# Patient Record
Sex: FEMALE | Race: WHITE | NOT HISPANIC OR LATINO | Employment: UNEMPLOYED | URBAN - METROPOLITAN AREA
[De-identification: names, ages, dates, MRNs, and addresses within clinical notes are randomized per-mention and may not be internally consistent; named-entity substitution may affect disease eponyms.]

---

## 2017-01-04 PROBLEM — L73.9 FOLLICULITIS: Status: ACTIVE | Noted: 2017-01-04

## 2017-06-02 ENCOUNTER — HOSPITAL ENCOUNTER (EMERGENCY)
Facility: HOSPITAL | Age: 47
Discharge: HOME | End: 2017-06-02
Attending: EMERGENCY MEDICINE
Payer: MEDICAID

## 2017-06-02 VITALS
WEIGHT: 260 LBS | SYSTOLIC BLOOD PRESSURE: 142 MMHG | RESPIRATION RATE: 20 BRPM | DIASTOLIC BLOOD PRESSURE: 78 MMHG | TEMPERATURE: 98 F | BODY MASS INDEX: 41.97 KG/M2 | HEART RATE: 72 BPM

## 2017-06-02 DIAGNOSIS — J32.1 FRONTAL SINUSITIS, UNSPECIFIED CHRONICITY: Primary | ICD-10-CM

## 2017-06-02 DIAGNOSIS — K08.89 PAIN, DENTAL: ICD-10-CM

## 2017-06-02 PROCEDURE — 99284 EMERGENCY DEPT VISIT MOD MDM: CPT

## 2017-06-02 PROCEDURE — 25000003 PHARM REV CODE 250: Performed by: EMERGENCY MEDICINE

## 2017-06-02 RX ORDER — HYDROCODONE BITARTRATE AND ACETAMINOPHEN 5; 325 MG/1; MG/1
1 TABLET ORAL
Status: COMPLETED | OUTPATIENT
Start: 2017-06-02 | End: 2017-06-02

## 2017-06-02 RX ORDER — HYDROCODONE BITARTRATE AND ACETAMINOPHEN 5; 325 MG/1; MG/1
1 TABLET ORAL EVERY 4 HOURS PRN
Qty: 15 TABLET | Refills: 0 | Status: SHIPPED | OUTPATIENT
Start: 2017-06-02 | End: 2017-06-03 | Stop reason: SDUPTHER

## 2017-06-02 RX ORDER — AMOXICILLIN 500 MG/1
500 CAPSULE ORAL
Status: COMPLETED | OUTPATIENT
Start: 2017-06-02 | End: 2017-06-02

## 2017-06-02 RX ORDER — AMOXICILLIN 500 MG/1
500 CAPSULE ORAL 3 TIMES DAILY
Qty: 21 CAPSULE | Refills: 0 | Status: SHIPPED | OUTPATIENT
Start: 2017-06-02 | End: 2017-06-03 | Stop reason: SDUPTHER

## 2017-06-02 RX ADMIN — HYDROCODONE BITARTRATE AND ACETAMINOPHEN 1 TABLET: 5; 325 TABLET ORAL at 08:06

## 2017-06-02 RX ADMIN — AMOXICILLIN 500 MG: 500 CAPSULE ORAL at 08:06

## 2017-06-03 NOTE — ED PROVIDER NOTES
Encounter Date: 6/2/2017       History     Chief Complaint   Patient presents with    Dental Pain    Facial Pain     Review of patient's allergies indicates:   Allergen Reactions    Latex, natural rubber Other (See Comments)     Latex condoms burning pain.      Spiriva respimat [tiotropium bromide] Hives    Iodine      Blisters (mouth)^  Blisters (mouth)^    Adhesive tape-silicones Rash     Use paper tape  If necessary     The history is provided by the patient.   Dental Pain   The primary symptoms include mouth pain, headaches and cough. Primary symptoms do not include dental injury, oral bleeding, oral lesions, fever, shortness of breath, sore throat or angioedema. The symptoms began today. The symptoms are unchanged. The symptoms are new.   Mouth pain began 2 - 6 hours ago. At its highest the mouth pain was at 3/10. The mouth pain is currently at 2/10.     The headache began today. The pain from the headache is at a severity of 1/10. Location/region(s) of the headache: frontal. The headache is associated with activity. The headache is not associated with aura, photophobia, eye pain, visual change, neck stiffness, paresthesias, weakness or loss of balance.   Additional symptoms do not include: facial swelling, hearing loss and nosebleeds.     Past Medical History:   Diagnosis Date    Anticoagulant long-term use     COPD (chronic obstructive pulmonary disease)     Cubital tunnel syndrome on right     Depression     Diabetes mellitus     Diabetes mellitus, type 2     Encounter for blood transfusion     GERD (gastroesophageal reflux disease)     Glaucoma suspect     Glaucoma suspect of both eyes     High cholesterol     History of appendectomy     Hyperlipemia     Hypertension     Muscle spasms of both lower extremities     Neuropathy     Obesity     Radiation thyroiditis     Sensory peripheral neuropathy     Stroke     tia 3-4 years ago     Past Surgical History:   Procedure Laterality Date     APPENDECTOMY      CARPAL TUNNEL RELEASE Right      SECTION  , 2002    x2    CHOLECYSTECTOMY      DILATION AND CURETTAGE OF UTERUS      EYE SURGERY      TUBAL LIGATION  2002     Family History   Problem Relation Age of Onset    Diabetes Mother     Coronary artery disease Mother     Heart attack Mother     Hypertension Mother     Thyroid disease Father     Diabetes Father     Hypertension Father     Cirrhosis Father     Hepatitis Father      Social History   Substance Use Topics    Smoking status: Current Every Day Smoker     Packs/day: 0.50     Years: 30.00     Types: Cigarettes     Start date: 10/30/1984    Smokeless tobacco: Never Used    Alcohol use No     Review of Systems   Constitutional: Negative for fever.   HENT: Positive for dental problem. Negative for facial swelling, hearing loss, nosebleeds and sore throat.    Eyes: Negative for photophobia and pain.   Respiratory: Positive for cough. Negative for shortness of breath, wheezing and stridor.    Cardiovascular: Negative for chest pain, palpitations and leg swelling.   Gastrointestinal: Negative for abdominal pain, nausea and vomiting.   Musculoskeletal: Negative for neck pain and neck stiffness.   Skin: Negative for color change, pallor, rash and wound.   Neurological: Positive for headaches. Negative for tremors, seizures, syncope, speech difficulty, weakness, paresthesias and loss of balance.       Physical Exam     Initial Vitals [17]   BP Pulse Resp Temp SpO2   (!) 160/82 110 20 97.9 °F (36.6 °C) --     Physical Exam    Nursing note and vitals reviewed.  Constitutional: She appears well-developed and well-nourished. She is not diaphoretic. No distress.   HENT:   Head: Normocephalic and atraumatic.       Mouth/Throat: No oropharyngeal exudate.   Eyes: Conjunctivae and EOM are normal. Pupils are equal, round, and reactive to light. Right eye exhibits no discharge. Left eye exhibits no discharge. No scleral  icterus.   Neck: Normal range of motion. Neck supple. No JVD present.   Pulmonary/Chest: Breath sounds normal. No stridor. No respiratory distress. She has no wheezes. She has no rhonchi. She has no rales. She exhibits no tenderness.   Abdominal: Soft. Bowel sounds are normal. She exhibits no distension. There is no tenderness. There is no rebound and no guarding.   Musculoskeletal: Normal range of motion. She exhibits no edema or tenderness.   Neurological: She is alert and oriented to person, place, and time. She has normal strength and normal reflexes. No sensory deficit.         ED Course   Procedures  Labs Reviewed - No data to display                            ED Course     Clinical Impression:   The primary encounter diagnosis was Frontal sinusitis, unspecified chronicity. A diagnosis of Pain, dental was also pertinent to this visit.    Disposition:   Disposition: Discharged  Condition: Stable       Lalo Terry MD  06/02/17 2040

## 2017-07-05 ENCOUNTER — CLINICAL SUPPORT (OUTPATIENT)
Dept: SMOKING CESSATION | Facility: CLINIC | Age: 47
End: 2017-07-05
Payer: COMMERCIAL

## 2017-07-05 DIAGNOSIS — F17.210 HEAVY CIGARETTE SMOKER (20-39 PER DAY): Primary | ICD-10-CM

## 2017-07-05 PROCEDURE — 99404 PREV MED CNSL INDIV APPRX 60: CPT | Mod: S$GLB,,,

## 2017-07-05 RX ORDER — ASPIRIN/CALCIUM CARB/MAGNESIUM 325 MG
4 TABLET ORAL
Qty: 108 LOZENGE | Refills: 0 | Status: SHIPPED | OUTPATIENT
Start: 2017-07-05 | End: 2017-07-05

## 2017-07-05 RX ORDER — DM/P-EPHED/ACETAMINOPH/DOXYLAM 30-7.5/3
2 LIQUID (ML) ORAL
Qty: 108 LOZENGE | Refills: 0 | Status: SHIPPED | OUTPATIENT
Start: 2017-07-05 | End: 2017-12-18

## 2017-07-05 RX ORDER — VARENICLINE TARTRATE 0.5 (11)-1
KIT ORAL
Qty: 1 PACKAGE | Refills: 0 | Status: SHIPPED | OUTPATIENT
Start: 2017-07-05 | End: 2017-08-01

## 2017-07-18 ENCOUNTER — CLINICAL SUPPORT (OUTPATIENT)
Dept: SMOKING CESSATION | Facility: CLINIC | Age: 47
End: 2017-07-18
Payer: COMMERCIAL

## 2017-07-18 DIAGNOSIS — F17.210 HEAVY CIGARETTE SMOKER (20-39 PER DAY): Primary | ICD-10-CM

## 2017-07-18 PROCEDURE — 90853 GROUP PSYCHOTHERAPY: CPT | Mod: S$GLB,,,

## 2017-07-18 NOTE — Clinical Note
31ppm; 30 cig/day; The patient remains on the prescribed tobacco cessation medication regimen of 1 mg Chantix BID without any negative side effects at this time; desire to quit = 8, confidence = 7

## 2017-07-18 NOTE — PROGRESS NOTES
Site: [facility]  Date:  7/18/2017  Clinical Status of Patient: Outpatient   Length of Service and Code: 90 minutes - 64291   Number in Attendance: 3  Group Activities/Focus of Group:  orientation, client introductions, completion of TCRS (Tobacco Cessation Rating Scale) learned addiction model, cues/triggers, personal reasons for quitting, medications, goals, quit date    Target symptoms:  withdrawal and medication side effects             The following were rated moderate (3) to severe (4) on TCRS:       Moderate 3: desire/crave tobacco; reviewed with patient     Severe 4:   Insomnia, jaw muscle ache, back pain; reviewed with patient  Patient's Response to Intervention: 31ppm; 30 cig/day; The patient remains on the prescribed tobacco cessation medication regimen of 1 mg Chantix BID without any negative side effects at this time; desire to quit = 8, confidence = 7       Progress Toward Goals and Other Mental Status Changes: The patient denies any abnormal behavioral or mental changes at this time.     Diagnosis: Z72.0  Plan: The patient will continue with group therapy sessions and medication monitoring by CTTS. Prescribed medication management will be by physician.   Return to Clinic: 1 week

## 2017-07-25 ENCOUNTER — CLINICAL SUPPORT (OUTPATIENT)
Dept: SMOKING CESSATION | Facility: CLINIC | Age: 47
End: 2017-07-25
Payer: COMMERCIAL

## 2017-07-25 DIAGNOSIS — F17.210 HEAVY CIGARETTE SMOKER (20-39 PER DAY): Primary | ICD-10-CM

## 2017-07-25 PROCEDURE — 90853 GROUP PSYCHOTHERAPY: CPT | Mod: S$GLB,,,

## 2017-07-25 NOTE — PROGRESS NOTES
Smoking Cessation Group Session #2    Site: [facility]  Date:  7/25/2017  Clinical Status of Patient: Outpatient   Length of Service and Code: 90 minutes - 95404   Number in Attendance: 3  Group Activities/Focus of Group: completion of TCRS (Tobacco Cessation Rating Scale) reviewed strategies, cues, and triggers. Introduced the negative impact of tobacco on health, the health advantages of discontinuing the use of tobacco, time line improved health changes after a quit, withdrawal issues to expect from nicotine and habit, and ways to achieve the goal of a quit.    Target symptoms:  withdrawal and medication side effects             The following were rated moderate (3) to severe (4) on TCRS:       Moderate 3: desire/crave tobacco, depressed mood/sad, fatigue/weakness; reviewed with patient     Severe 4:   Angry/irritable/frustrated, increased appetite/hunger, back pain; reviewed with patient  Patient's Response to Intervention: 16ppm; 25 cig/day; The patient remains on the prescribed tobacco cessation medication regimen of 1 mg Chantix BID, along with 2mg nicotine lozenges, without any negative side effects at t his time; desire to quit = 8, confidence = 7       Progress Toward Goals and Other Mental Status Changes: The patient denies any abnormal behavioral or mental changes at this time.     Diagnosis: Z72.0  Plan: The patient will continue with group therapy sessions and medication monitoring by CTTS. Prescribed medication management will be by physician.   Return to Clinic: 1 week    Quit Date:    Planned Quit Date:

## 2017-07-25 NOTE — Clinical Note
16ppm; 25 cig/day; The patient remains on the prescribed tobacco cessation medication regimen of 1 mg Chantix BID, along with 2mg nicotine lozenges, without any negative side effects at t his time; desire to quit = 8, confidence = 7

## 2017-08-01 ENCOUNTER — CLINICAL SUPPORT (OUTPATIENT)
Dept: SMOKING CESSATION | Facility: CLINIC | Age: 47
End: 2017-08-01
Payer: COMMERCIAL

## 2017-08-01 DIAGNOSIS — F17.210 HEAVY CIGARETTE SMOKER (20-39 PER DAY): Primary | ICD-10-CM

## 2017-08-01 PROCEDURE — 90853 GROUP PSYCHOTHERAPY: CPT | Mod: S$GLB,,,

## 2017-08-01 RX ORDER — VARENICLINE TARTRATE 1 MG/1
1 TABLET, FILM COATED ORAL 2 TIMES DAILY
Qty: 60 TABLET | Refills: 0 | Status: SHIPPED | OUTPATIENT
Start: 2017-08-01 | End: 2017-09-02

## 2017-08-01 NOTE — Clinical Note
12ppm; 18-25 cig/day; The patient remains on the prescribed tobacco cessation medication regimen of 1 mg Chantix BID without any negative side effects at this time; desire to quit = 10, confidence = 10

## 2017-08-01 NOTE — PROGRESS NOTES
Smoking Cessation Group Session #3    Site: [facility]  Date:  8/1/2017  Clinical Status of Patient: Outpatient   Length of Service and Code: 90 minutes - 89431   Number in Attendance: 3  Group Activities/Focus of Group:  completion of TCRS (Tobacco Cessation Rating Scale) reviewed strategies, controlling environment, cues, triggers, new goals set. Introduced high risk situations with preparation interventions, caffeine similarities with withdrawal issues of habit and nicotine, Alcohol, Understanding urges, cravings, stress and relaxation. Open discussion with intervention discussion.    Target symptoms:  withdrawal and medication side effects             The following were rated moderate (3) to severe (4) on TCRS:       Moderate 3: angry/irritable/frustrated, increased appetite/hunger, difficulty concentrating, insomnia, dizzy, tremor/shaky, agitated/worked up, fatigue/weakness; reviewed with patient     Severe 4:   Back pain, dry mouth; reviewed with patient  Patient's Response to Intervention: 12ppm; 18-25 cig/day; The patient remains on the prescribed tobacco cessation medication regimen of 1 mg Chantix BID without any negative side effects at this time; desire to quit = 10, confidence = 10       Progress Toward Goals and Other Mental Status Changes: The patient denies any abnormal behavioral or mental changes at this time.     Diagnosis: Z72.0  Plan: The patient will continue with group therapy sessions and medication monitoring by CTTS. Prescribed medication management will be by physician.   Return to Clinic: 1 week    Quit Date:    Planned Quit Date:

## 2017-08-03 DIAGNOSIS — F41.9 ANXIETY: Chronic | ICD-10-CM

## 2017-08-04 RX ORDER — BUSPIRONE HYDROCHLORIDE 15 MG/1
15 TABLET ORAL 2 TIMES DAILY
Qty: 60 TABLET | Refills: 5 | OUTPATIENT
Start: 2017-08-04 | End: 2018-08-04

## 2017-08-04 RX ORDER — BACLOFEN 10 MG/1
10 TABLET ORAL 3 TIMES DAILY
Qty: 90 TABLET | Refills: 8 | OUTPATIENT
Start: 2017-08-04 | End: 2018-08-04

## 2017-08-15 ENCOUNTER — CLINICAL SUPPORT (OUTPATIENT)
Dept: SMOKING CESSATION | Facility: CLINIC | Age: 47
End: 2017-08-15
Payer: COMMERCIAL

## 2017-08-15 DIAGNOSIS — F17.210 MODERATE SMOKER (20 OR LESS PER DAY): Primary | ICD-10-CM

## 2017-08-15 PROCEDURE — 90853 GROUP PSYCHOTHERAPY: CPT | Mod: S$GLB,,,

## 2017-08-15 NOTE — PROGRESS NOTES
Smoking Cessation Group Session #5    Site: [facility]  Date:  8/15/2017  Clinical Status of Patient: Outpatient   Length of Service and Code: 90 minutes - 49497   Number in Attendance: 3  Group Activities/Focus of Group:  completion of TCRS (Tobacco Cessation Rating Scale) reviewed strategies, habitual behavior, high risks situations, understanding urges and cravings, stress and relaxation with open discussion and additional interventions, Introduced lapses, relapses, understanding them and analyzing the situation of a lapse, conflict issues that may be linked to a lapse.     Target symptoms:  withdrawal and medication side effects             The following were rated moderate (3) to severe (4) on TCRS:       Moderate 3: angry/irritable/frustrated, anxious/nervous, dry mouth; reviewed with patient     Severe 4:   Increased appetite/hunger, insomnia, back pain; reviewed with patient  Patient's Response to Intervention: 14ppm; 20 cig/day; The patient remains on the prescribed tobacco cessation medication regimen of 1 mg Chantix BID, along with 2mg nicotine lozenges as needed for cravings, without any negative side effects at this time; desire to quit = 8, confidence = 6      Progress Toward Goals and Other Mental Status Changes: The patient denies any abnormal behavioral or mental changes at this time.     Diagnosis: Z72.0  Plan: The patient will continue with group therapy sessions and medication monitoring by CTTS. Prescribed medication management will be by physician.   Return to Clinic: 1 week    Quit Date:    Planned Quit Date:

## 2017-08-15 NOTE — Clinical Note
14ppm; 20 cig/day; The patient remains on the prescribed tobacco cessation medication regimen of 1 mg Chantix BID, along with 2mg nicotine lozenges as needed for cravings, without any negative side effects at this time; desire to quit = 8, confidence = 6

## 2017-08-22 ENCOUNTER — CLINICAL SUPPORT (OUTPATIENT)
Dept: SMOKING CESSATION | Facility: CLINIC | Age: 47
End: 2017-08-22
Payer: COMMERCIAL

## 2017-08-22 DIAGNOSIS — F17.210 MODERATE SMOKER (20 OR LESS PER DAY): Primary | ICD-10-CM

## 2017-08-22 PROCEDURE — 90853 GROUP PSYCHOTHERAPY: CPT | Mod: S$GLB,,,

## 2017-08-22 NOTE — PROGRESS NOTES
Smoking Cessation Group Session #6    Site: [facility]  Date:  8/22/2017  Clinical Status of Patient: Outpatient   Length of Service and Code: 90 minutes - 74397   Number in Attendance: 3  Group Activities/Focus of Group:  completion of TCRS (Tobacco Cessation Rating Scale) reviewed strategies, cues, triggers, high risk situations, lapses, relapses, diet, exercise, stress, relaxation, sleep, habitual behavior, and life style changes.    Target symptoms:  withdrawal and medication side effects             The following were rated moderate (3) to severe (4) on TCRS:       Moderate 3: insomnia, restless, dry mouth; reviewed with patient     Severe 4:   Back pain; reviewed with patient  Patient's Response to Intervention: 24ppm; 20 cig/day; The patient remains on the prescribed tobacco cessation medication regimen of 1 mg Chantix BID without any negative side effects at this time; desire to quit = 8, confidence = 7      Progress Toward Goals and Other Mental Status Changes: The patient denies any abnormal behavioral or mental changes at this time.     Diagnosis: Z72.0  Plan:The patient will continue with group therapy sessions and medication monitoring by CTTS. Prescribed medication management will be by physician.   Return to Clinic: 2 weeks    Quit Date:    Planned Quit Date:

## 2017-08-22 NOTE — Clinical Note
24ppm; 20 cig/day; The patient remains on the prescribed tobacco cessation medication regimen of 1 mg Chantix BID without any negative side effects at this time; desire to quit = 8, confidence = 7

## 2017-08-24 PROBLEM — E78.5 HYPERLIPEMIA: Status: ACTIVE | Noted: 2017-08-24

## 2017-09-05 ENCOUNTER — CLINICAL SUPPORT (OUTPATIENT)
Dept: SMOKING CESSATION | Facility: CLINIC | Age: 47
End: 2017-09-05
Payer: COMMERCIAL

## 2017-09-05 DIAGNOSIS — F17.210 MODERATE SMOKER (20 OR LESS PER DAY): Primary | ICD-10-CM

## 2017-09-05 PROCEDURE — 99403 PREV MED CNSL INDIV APPRX 45: CPT | Mod: S$GLB,,,

## 2017-09-05 NOTE — Clinical Note
9ppm; 20 cigj/day; The patient remains on the prescribed tobacco cessation medication regimen of Chantix, reducing to .5mg BID to reduce side effects of anger/agitation/frustration, without any negative side effects at this time; desire to quit = 4, confidence = 4

## 2017-09-05 NOTE — PROGRESS NOTES
Individual Follow-Up Form    9/5/2017    Quit Date:     Clinical Status of Patient: Outpatient    Length of Service: 45 minutes    Continuing Medication: yes  Chantix    Other Medications:      Target Symptoms: Withdrawal and medication side effects. The following were  rated moderate (3) to severe (4) on TCRS:  · Moderate (3): increased appetite/hunger, difficulty concentrating, anxious/nervous, insomnia, restless/impatient, back pain, agitated/worked up; reviewed with patient  · Severe (4): angry/irritable/frustrated; reviewed with patient    Comments: 9ppm; 20 cigj/day; The patient remains on the prescribed tobacco cessation medication regimen of Chantix, reducing to .5mg BID to reduce side effects of anger/agitation/frustration, without any negative side effects at this time; desire to quit = 4, confidence = 4        Diagnosis: F17.210    Next Visit: 1 week

## 2017-09-12 ENCOUNTER — TELEPHONE (OUTPATIENT)
Dept: SMOKING CESSATION | Facility: CLINIC | Age: 47
End: 2017-09-12

## 2017-09-14 ENCOUNTER — CLINICAL SUPPORT (OUTPATIENT)
Dept: SMOKING CESSATION | Facility: CLINIC | Age: 47
End: 2017-09-14
Payer: COMMERCIAL

## 2017-09-14 DIAGNOSIS — F17.210 MODERATE SMOKER (20 OR LESS PER DAY): Primary | ICD-10-CM

## 2017-09-14 PROCEDURE — 99406 BEHAV CHNG SMOKING 3-10 MIN: CPT | Mod: S$GLB,,,

## 2017-10-05 ENCOUNTER — CLINICAL SUPPORT (OUTPATIENT)
Dept: SMOKING CESSATION | Facility: CLINIC | Age: 47
End: 2017-10-05
Payer: COMMERCIAL

## 2017-10-05 DIAGNOSIS — F17.210 MODERATE SMOKER (20 OR LESS PER DAY): Primary | ICD-10-CM

## 2017-10-05 PROBLEM — N89.8 VAGINAL ITCHING: Status: ACTIVE | Noted: 2017-10-05

## 2017-10-05 PROBLEM — N88.9 CERVICAL LESION: Status: ACTIVE | Noted: 2017-10-05

## 2017-10-05 PROCEDURE — 99402 PREV MED CNSL INDIV APPRX 30: CPT | Mod: S$GLB,,,

## 2017-10-05 NOTE — PROGRESS NOTES
Individual Follow-Up Form    10/5/2017    Quit Date:     Clinical Status of Patient: Outpatient    Length of Service: 30 minutes    Continuing Medication: no  Other Medications:      Target Symptoms: Withdrawal and medication side effects. The following were  rated moderate (3) to severe (4) on TCRS:  · Moderate (3): angry/irritable/frustrated, desire/crave tobacco, depressed mood/sad, difficulty concentrating, anxious/nervous, insomnia, sweating more than usual, dry mouth, agitated/worked up; reviewed with patient  · Severe (4): increased appetite/hunger, back pain; reviewed with patient    Comments: 13ppm; 20 cig/day;  Pt discontinued Chantix due to side effects of angry/agitated/frustrated but does not want to restart until after appt with her new mental health provider who may be making changes on some of her anxiety/depression meds, appt with doctor is 10/25 and her counselor on 10/27, next visit with smoking cessation clinic is 11/2 to discuss restarting cessation meds; desire to quit = 6, confidence = 6        Diagnosis: F17.210    Next Visit: 11/2/17

## 2017-10-05 NOTE — Clinical Note
13ppm; 20 cig/day;  Pt discontinued Chantix due to side effects of angry/agitated/frustrated but does not want to restart until after appt with her new mental health provider who may be making changes on some of her anxiety/depression meds, appt with doctor is 10/25 and her counselor on 10/27, next visit with smoking cessation clinic is 11/2 to discuss restarting cessation meds; desire to quit = 6, confidence = 6

## 2017-10-20 PROBLEM — N88.9 CERVICAL LESION: Status: RESOLVED | Noted: 2017-10-05 | Resolved: 2017-10-20

## 2017-11-02 ENCOUNTER — TELEPHONE (OUTPATIENT)
Dept: SMOKING CESSATION | Facility: CLINIC | Age: 47
End: 2017-11-02

## 2017-11-02 NOTE — TELEPHONE ENCOUNTER
Pt called to state would not be able to make appt due to attending a drill meet with her daughter.  Pt states will call after meet is over to discuss follow up.

## 2017-12-15 ENCOUNTER — HOSPITAL ENCOUNTER (OUTPATIENT)
Dept: PULMONOLOGY | Facility: HOSPITAL | Age: 47
Discharge: HOME | End: 2017-12-15
Attending: PSYCHIATRY & NEUROLOGY
Payer: MEDICAID

## 2017-12-15 DIAGNOSIS — F39 UNSPECIFIED MOOD (AFFECTIVE) DISORDER: ICD-10-CM

## 2017-12-15 DIAGNOSIS — F39 UNSPECIFIED MOOD (AFFECTIVE) DISORDER: Primary | ICD-10-CM

## 2017-12-15 PROCEDURE — 93010 ELECTROCARDIOGRAM REPORT: CPT | Mod: ,,, | Performed by: INTERNAL MEDICINE

## 2017-12-15 PROCEDURE — 93005 ELECTROCARDIOGRAM TRACING: CPT

## 2018-01-02 RX ORDER — GABAPENTIN 600 MG/1
900 TABLET ORAL 3 TIMES DAILY
Qty: 120 TABLET | Refills: 5 | Status: CANCELLED | OUTPATIENT
Start: 2018-01-02 | End: 2018-07-01

## 2018-01-08 ENCOUNTER — HOSPITAL ENCOUNTER (EMERGENCY)
Facility: HOSPITAL | Age: 48
Discharge: HOME | End: 2018-01-08
Attending: SURGERY | Admitting: NURSE PRACTITIONER
Payer: MEDICAID

## 2018-01-08 VITALS
TEMPERATURE: 99 F | HEART RATE: 108 BPM | BODY MASS INDEX: 39.28 KG/M2 | OXYGEN SATURATION: 97 % | WEIGHT: 243.38 LBS | DIASTOLIC BLOOD PRESSURE: 89 MMHG | RESPIRATION RATE: 17 BRPM | SYSTOLIC BLOOD PRESSURE: 153 MMHG

## 2018-01-08 DIAGNOSIS — L73.9 FOLLICULITIS: Primary | ICD-10-CM

## 2018-01-08 PROCEDURE — 99283 EMERGENCY DEPT VISIT LOW MDM: CPT

## 2018-01-08 RX ORDER — MUPIROCIN 20 MG/G
OINTMENT TOPICAL 3 TIMES DAILY
Qty: 15 G | Refills: 0 | Status: SHIPPED | OUTPATIENT
Start: 2018-01-08 | End: 2018-02-22 | Stop reason: SDUPTHER

## 2018-01-08 RX ORDER — CLINDAMYCIN HYDROCHLORIDE 300 MG/1
300 CAPSULE ORAL EVERY 6 HOURS
Qty: 28 CAPSULE | Refills: 0 | Status: SHIPPED | OUTPATIENT
Start: 2018-01-08 | End: 2018-01-15

## 2018-01-08 NOTE — ED PROVIDER NOTES
Encounter Date: 2018       History     Chief Complaint   Patient presents with    Wound Check     Patient presents with multiple wounds to R lower extremity and abdomen. Reports that the wounds have been there for several months now, and they won't heal properly. Wounds are round and start small, but progress. Wounds are associated with erythema, drainage, and tenderness to touch. States that she has uncontrolled DM. Reports that she sees her PCP tomorrow for a recheck. States that has been taking her prescribed medications as directed without skipping doses. Patient states that she sometimes scratches wounds, which also delays healing. Has tried applying antibiotic ointment without improvement. Came here today for PO antibiotics. Patient reports that her temperature is usually 99F.      The history is provided by the patient.     Review of patient's allergies indicates:   Allergen Reactions    Latex, natural rubber Other (See Comments)     Latex condoms burning pain.      Spiriva respimat [tiotropium bromide] Hives    Iodine      Blisters (mouth)^  Blisters (mouth)^    Adhesive tape-silicones Rash     Use paper tape  If necessary     Past Medical History:   Diagnosis Date    Anticoagulant long-term use     COPD (chronic obstructive pulmonary disease)     Cubital tunnel syndrome on right     Depression     Diabetes mellitus     Diabetes mellitus, type 2     Encounter for blood transfusion     GERD (gastroesophageal reflux disease)     Glaucoma suspect     Glaucoma suspect of both eyes     High cholesterol     History of appendectomy     Hyperlipemia     Hypertension     Muscle spasms of both lower extremities     Neuropathy     Obesity     Radiation thyroiditis     Sensory peripheral neuropathy     Stroke     tia 3-4 years ago     Past Surgical History:   Procedure Laterality Date    APPENDECTOMY      CARPAL TUNNEL RELEASE Right      SECTION  , 2002    x2     CHOLECYSTECTOMY      DILATION AND CURETTAGE OF UTERUS  1992    EYE SURGERY      TUBAL LIGATION  2002     Family History   Problem Relation Age of Onset    Diabetes Mother     Coronary artery disease Mother     Heart attack Mother     Hypertension Mother     Thyroid disease Father     Diabetes Father     Hypertension Father     Cirrhosis Father     Hepatitis Father      Social History   Substance Use Topics    Smoking status: Current Every Day Smoker     Packs/day: 1.00     Years: 30.00     Types: Cigarettes     Start date: 10/30/1984    Smokeless tobacco: Never Used    Alcohol use No     Review of Systems   Constitutional: Negative for chills, fatigue and fever.   HENT: Negative for congestion, dental problem, ear pain, rhinorrhea, sore throat and trouble swallowing.    Eyes: Negative for pain, discharge, redness and visual disturbance.   Respiratory: Negative for cough, chest tightness, shortness of breath and wheezing.    Cardiovascular: Negative for chest pain, palpitations and leg swelling.   Gastrointestinal: Negative for abdominal pain, constipation, diarrhea, nausea and vomiting.   Genitourinary: Negative for difficulty urinating, dysuria, flank pain, frequency, hematuria and urgency.   Musculoskeletal: Negative for arthralgias, back pain and myalgias.   Skin: Positive for wound (multiple wounds (refer to HPI)). Negative for color change, pallor and rash.   Neurological: Negative for seizures, weakness and headaches.   Psychiatric/Behavioral: Negative.        Physical Exam     Initial Vitals [01/08/18 1242]   BP Pulse Resp Temp SpO2   (!) 153/89 108 17 99.1 °F (37.3 °C) 97 %      MAP       110.33         Physical Exam    Nursing note and vitals reviewed.  Constitutional: No distress.   HENT:   Head: Normocephalic and atraumatic.   Right Ear: External ear normal.   Left Ear: External ear normal.   Nose: Nose normal.   Mouth/Throat: Oropharynx is clear and moist.   Eyes: Conjunctivae, EOM and  lids are normal. Pupils are equal, round, and reactive to light.   Neck: Normal range of motion. Neck supple.   Cardiovascular: Normal rate, regular rhythm, S1 normal, S2 normal and intact distal pulses.   Pulmonary/Chest: Effort normal and breath sounds normal. No respiratory distress. She has no wheezes. She has no rhonchi.   Abdominal: Soft. Bowel sounds are normal. She exhibits no distension. There is no tenderness.   Musculoskeletal: Normal range of motion.   Lymphadenopathy:     She has no cervical adenopathy.   Neurological: She is alert and oriented to person, place, and time. She has normal strength.   Skin: Skin is warm and dry. Capillary refill takes less than 2 seconds. There is erythema.   Multiple round shallow ulcerations (about the size of a dime) with surrounding erythema and tenderness with palpation (4 noted to R lower extremity and 2 noted to abdomen). No drainage noted. Also noted: multiple small round healing wounds in different stages noted to R lower extremity. No drainage noted.    Psychiatric: She has a normal mood and affect. Her speech is normal and behavior is normal.         ED Course   Procedures  Labs Reviewed - No data to display                            ED Course      Clinical Impression:   The encounter diagnosis was Folliculitis.    Disposition:   Disposition: Discharged  Condition: Stable    The patient acknowledges that close follow up with medical provider is required. Instructed to follow up with PCP within 2 days. The patient agrees to comply with all instruction and directions given in the ER.     Educated patient on the importance of diabetes management. Verified with patient that she has an appointment with PCP tomorrow for DM follow up. Reports that she is taking all her prescribed mediations as directed without skipping doses.    New Prescriptions    CLINDAMYCIN (CLEOCIN) 300 MG CAPSULE    Take 1 capsule (300 mg total) by mouth every 6 (six) hours.    MUPIROCIN  (BACTROBAN) 2 % OINTMENT    Apply topically 3 (three) times daily.                         Asmita Villeda, LATRELL  01/08/18 2259

## 2018-01-08 NOTE — DISCHARGE INSTRUCTIONS
Keep follow up appointment tomorrow for diabetes management. Blood sugar management is important in the treatment of your wounds.     Wash area twice a day with antibacterial soap and water. Apply antibiotic ointment three times a day. Keep area clean. Take all antibiotics. Monitor for signs of infection such as redness, swelling, purulent discharge, or fever.     **Drink plenty fluids. Get plenty rest. Over the counter tylenol or motrin as needed for pain and/or fever as directed on package insert. Wash hands frequently.    **Our goal in the emergency department is to always give you outstanding care and exceptional service. You may receive a survey by mail or e-mail in the next week regarding your experience in our ED. We would greatly appreciate your completing and returning the survey. Your feedback provides us with a way to recognize our staff who give very good care and it helps us learn how to improve when your experience was below our aspiration of excellence.     **Return to the ER as needed.

## 2018-01-15 PROBLEM — E11.29 MICROALBUMINURIA DUE TO TYPE 2 DIABETES MELLITUS: Chronic | Status: ACTIVE | Noted: 2018-01-15

## 2018-01-15 PROBLEM — R80.9 MICROALBUMINURIA DUE TO TYPE 2 DIABETES MELLITUS: Chronic | Status: ACTIVE | Noted: 2018-01-15

## 2018-01-15 PROBLEM — E11.69 HYPERLIPIDEMIA ASSOCIATED WITH TYPE 2 DIABETES MELLITUS: Status: ACTIVE | Noted: 2017-08-24

## 2018-03-22 PROBLEM — Z09 FOLLOW UP: Status: ACTIVE | Noted: 2018-03-22

## 2018-04-03 ENCOUNTER — HOSPITAL ENCOUNTER (EMERGENCY)
Facility: HOSPITAL | Age: 48
Discharge: HOME | End: 2018-04-04
Attending: SURGERY
Payer: MEDICAID

## 2018-04-03 DIAGNOSIS — N20.1 LEFT URETERAL STONE: Primary | ICD-10-CM

## 2018-04-03 DIAGNOSIS — R10.9 FLANK PAIN: ICD-10-CM

## 2018-04-03 LAB
ALBUMIN SERPL BCP-MCNC: 3.6 G/DL
ALP SERPL-CCNC: 54 U/L
ALT SERPL W/O P-5'-P-CCNC: 36 U/L
ANION GAP SERPL CALC-SCNC: 15 MMOL/L
AST SERPL-CCNC: 62 U/L
BACTERIA #/AREA URNS HPF: ABNORMAL /HPF
BASOPHILS # BLD AUTO: 0.07 K/UL
BASOPHILS NFR BLD: 0.6 %
BILIRUB SERPL-MCNC: 0.3 MG/DL
BILIRUB UR QL STRIP: NEGATIVE
BUN SERPL-MCNC: 26 MG/DL
CALCIUM SERPL-MCNC: 9 MG/DL
CHLORIDE SERPL-SCNC: 99 MMOL/L
CK MB SERPL-MCNC: 2 NG/ML
CK MB SERPL-RTO: 2.3 %
CK SERPL-CCNC: 86 U/L
CK SERPL-CCNC: 86 U/L
CLARITY UR: CLEAR
CO2 SERPL-SCNC: 23 MMOL/L
COLOR UR: YELLOW
CREAT SERPL-MCNC: 1.5 MG/DL
DIFFERENTIAL METHOD: ABNORMAL
EOSINOPHIL # BLD AUTO: 0.3 K/UL
EOSINOPHIL NFR BLD: 2.3 %
ERYTHROCYTE [DISTWIDTH] IN BLOOD BY AUTOMATED COUNT: 15.2 %
EST. GFR  (AFRICAN AMERICAN): 48 ML/MIN/1.73 M^2
EST. GFR  (NON AFRICAN AMERICAN): 41 ML/MIN/1.73 M^2
GLUCOSE SERPL-MCNC: 112 MG/DL
GLUCOSE SERPL-MCNC: 112 MG/DL (ref 70–110)
GLUCOSE UR QL STRIP: ABNORMAL
HCT VFR BLD AUTO: 34.8 %
HGB BLD-MCNC: 11.6 G/DL
HGB UR QL STRIP: ABNORMAL
HYALINE CASTS #/AREA URNS LPF: 0 /LPF
KETONES UR QL STRIP: NEGATIVE
LEUKOCYTE ESTERASE UR QL STRIP: NEGATIVE
LYMPHOCYTES # BLD AUTO: 3.4 K/UL
LYMPHOCYTES NFR BLD: 27.6 %
MCH RBC QN AUTO: 29.5 PG
MCHC RBC AUTO-ENTMCNC: 33.3 G/DL
MCV RBC AUTO: 89 FL
MICROSCOPIC COMMENT: ABNORMAL
MONOCYTES # BLD AUTO: 0.8 K/UL
MONOCYTES NFR BLD: 6.4 %
NEUTROPHILS # BLD AUTO: 7.8 K/UL
NEUTROPHILS NFR BLD: 63.1 %
NITRITE UR QL STRIP: NEGATIVE
PH UR STRIP: 5 [PH] (ref 5–8)
PLATELET # BLD AUTO: 291 K/UL
PMV BLD AUTO: 11 FL
POCT GLUCOSE: 112 MG/DL (ref 70–110)
POTASSIUM SERPL-SCNC: 3.4 MMOL/L
PROT SERPL-MCNC: 7.5 G/DL
PROT UR QL STRIP: ABNORMAL
RBC # BLD AUTO: 3.93 M/UL
RBC #/AREA URNS HPF: >100 /HPF (ref 0–4)
SODIUM SERPL-SCNC: 137 MMOL/L
SP GR UR STRIP: 1.01 (ref 1–1.03)
TROPONIN I SERPL DL<=0.01 NG/ML-MCNC: <0.006 NG/ML
URN SPEC COLLECT METH UR: ABNORMAL
UROBILINOGEN UR STRIP-ACNC: NEGATIVE EU/DL
WBC # BLD AUTO: 12.3 K/UL
WBC #/AREA URNS HPF: 8 /HPF (ref 0–5)

## 2018-04-03 PROCEDURE — 84484 ASSAY OF TROPONIN QUANT: CPT

## 2018-04-03 PROCEDURE — 96361 HYDRATE IV INFUSION ADD-ON: CPT

## 2018-04-03 PROCEDURE — 93005 ELECTROCARDIOGRAM TRACING: CPT

## 2018-04-03 PROCEDURE — 82550 ASSAY OF CK (CPK): CPT

## 2018-04-03 PROCEDURE — 82553 CREATINE MB FRACTION: CPT

## 2018-04-03 PROCEDURE — 85025 COMPLETE CBC W/AUTO DIFF WBC: CPT

## 2018-04-03 PROCEDURE — 99284 EMERGENCY DEPT VISIT MOD MDM: CPT | Mod: 25

## 2018-04-03 PROCEDURE — 81000 URINALYSIS NONAUTO W/SCOPE: CPT

## 2018-04-03 PROCEDURE — 25000003 PHARM REV CODE 250: Performed by: SURGERY

## 2018-04-03 PROCEDURE — 93010 ELECTROCARDIOGRAM REPORT: CPT | Mod: ,,, | Performed by: INTERNAL MEDICINE

## 2018-04-03 PROCEDURE — 36415 COLL VENOUS BLD VENIPUNCTURE: CPT

## 2018-04-03 PROCEDURE — 82962 GLUCOSE BLOOD TEST: CPT

## 2018-04-03 PROCEDURE — 63600175 PHARM REV CODE 636 W HCPCS: Performed by: SURGERY

## 2018-04-03 PROCEDURE — 80053 COMPREHEN METABOLIC PANEL: CPT

## 2018-04-03 PROCEDURE — 96375 TX/PRO/DX INJ NEW DRUG ADDON: CPT

## 2018-04-03 RX ORDER — ONDANSETRON 2 MG/ML
4 INJECTION INTRAMUSCULAR; INTRAVENOUS
Status: COMPLETED | OUTPATIENT
Start: 2018-04-03 | End: 2018-04-03

## 2018-04-03 RX ORDER — SODIUM CHLORIDE 9 MG/ML
1000 INJECTION, SOLUTION INTRAVENOUS
Status: COMPLETED | OUTPATIENT
Start: 2018-04-03 | End: 2018-04-03

## 2018-04-03 RX ORDER — HYDROMORPHONE HYDROCHLORIDE 1 MG/ML
1 INJECTION, SOLUTION INTRAMUSCULAR; INTRAVENOUS; SUBCUTANEOUS
Status: COMPLETED | OUTPATIENT
Start: 2018-04-03 | End: 2018-04-03

## 2018-04-03 RX ADMIN — HYDROMORPHONE HYDROCHLORIDE 1 MG: 1 INJECTION, SOLUTION INTRAMUSCULAR; INTRAVENOUS; SUBCUTANEOUS at 10:04

## 2018-04-03 RX ADMIN — SODIUM CHLORIDE 1000 ML: 0.9 INJECTION, SOLUTION INTRAVENOUS at 10:04

## 2018-04-03 RX ADMIN — ONDANSETRON 4 MG: 2 INJECTION INTRAMUSCULAR; INTRAVENOUS at 10:04

## 2018-04-04 VITALS
RESPIRATION RATE: 16 BRPM | DIASTOLIC BLOOD PRESSURE: 62 MMHG | TEMPERATURE: 97 F | WEIGHT: 248 LBS | BODY MASS INDEX: 40.03 KG/M2 | SYSTOLIC BLOOD PRESSURE: 126 MMHG | OXYGEN SATURATION: 100 % | HEART RATE: 99 BPM

## 2018-04-04 PROCEDURE — 25000003 PHARM REV CODE 250: Performed by: INTERNAL MEDICINE

## 2018-04-04 PROCEDURE — 63600175 PHARM REV CODE 636 W HCPCS: Performed by: INTERNAL MEDICINE

## 2018-04-04 PROCEDURE — 96376 TX/PRO/DX INJ SAME DRUG ADON: CPT

## 2018-04-04 PROCEDURE — 96365 THER/PROPH/DIAG IV INF INIT: CPT

## 2018-04-04 RX ORDER — HYDROMORPHONE HYDROCHLORIDE 1 MG/ML
0.5 INJECTION, SOLUTION INTRAMUSCULAR; INTRAVENOUS; SUBCUTANEOUS
Status: COMPLETED | OUTPATIENT
Start: 2018-04-04 | End: 2018-04-04

## 2018-04-04 RX ADMIN — SODIUM CHLORIDE 1000 ML: 0.9 INJECTION, SOLUTION INTRAVENOUS at 12:04

## 2018-04-04 RX ADMIN — PROMETHAZINE HYDROCHLORIDE 6.25 MG: 25 INJECTION INTRAMUSCULAR; INTRAVENOUS at 12:04

## 2018-04-04 RX ADMIN — HYDROMORPHONE HYDROCHLORIDE 0.5 MG: 1 INJECTION, SOLUTION INTRAMUSCULAR; INTRAVENOUS; SUBCUTANEOUS at 12:04

## 2018-04-04 NOTE — ED PROVIDER NOTES
Encounter Date: 4/3/2018       History     Chief Complaint   -- Flank Pain     HPI   Hue Hernandez is a 47 y.o. female presents with left flank pain this evening  Patient states that she has a history of arthritis and lower lumbar pain issues  Patient denies any dysuria or hematuria, denies any kidney stone history now  Patient denies any fever, denies any history of urinary tract infection recently  Patient has no nausea or vomiting, no abdominal pain, only left flank pain now    The history is provided by the patient  ALLERGIES: Latex, Spiriva, iodine, adhesives    Past Medical History   -- Anticoagulant long-term use    -- COPD (chronic obstructive pulmonary disease)    -- Cubital tunnel syndrome on right    -- Depression    -- Diabetes mellitus    -- Diabetes mellitus, type 2    -- Encounter for blood transfusion    -- GERD (gastroesophageal reflux disease)    -- Glaucoma suspect    -- Glaucoma suspect of both eyes    -- High cholesterol    -- History of appendectomy    -- Hyperlipemia    -- Hypertension    -- Muscle spasms of both lower extremities    -- Neuropathy    -- Obesity    -- Radiation thyroiditis    -- Sensory peripheral neuropathy    -- Stroke      Past Surgical History   -- APPENDECTOMY     -- CARPAL TUNNEL RELEASE     --  SECTION     -- CHOLECYSTECTOMY     -- DILATION AND CURETTAGE OF UTERUS     -- EYE SURGERY     -- TUBAL LIGATION       Review of Systems and Physical Exam      Review of Systems   · Constitution - no fever, denies fatigue, no weakness, stable weight  · Eyes - no tearing or redness, no change in vision, no double vision  · Ear, Nose - no tinnitus or earache, no nasal congestion or discharge  · Mouth,Throat - no sore throat, no toothache, denies dysphagia, normal swallowing  · Respiratory - denies cough and sputum, no shortness of breath, no LIRA, no wheeze  · Cardiovascular - denies chest pain, no palpitations, denies claudication. No orthopnea   · Gastrointestinal -  denies abdominal pain, nausea, vomiting, diarrhea, or constipation.   · Genitourinary - left flank pain and no dysuria, also denies frequency or urgency  · Musculoskeletal - denies muscle or joint pain, back pain  · Skin - denies rash or changes in skin, no hives or welts  · Neurological - no headache, denies weakness or seizure; no LOC  · Lymphatic- no lymphadenopathy or lymphedema noted by patient    /60  Pulse 101   Temp 97.9 °F (36.6 °C) (Oral)   Resp 16      Physical Exam   -- Nursing note and vitals reviewed  -- Constitutional: Appears well-developed and well-nourished  -- Head: Atraumatic. Normocephalic. No obvious abnormality  -- Eyes: Pupils are equal and reactive to light. Normal conjunctiva and lids  -- Cardiac: Normal rate, regular rhythm and normal heart sounds  -- Pulmonary: Normal respiratory effort, breath sounds clear to auscultation  -- Abdominal: Soft, no tenderness. Normal bowel sounds. Normal liver edge  -- Genitourinary: no flank pain on exam, no suprapubic pain by palpation   -- Musculoskeletal: Normal range of motion, no effusions. Joints stable   -- Neurological: No focal deficits. Showed good interaction with staff  -- Vascular: Posterior tibial, dorsalis pedis and radial pulses 2+ bilaterally      ED Course   Procedures  Labs Reviewed   CBC W/ AUTO DIFFERENTIAL - Abnormal; Notable for the following:        Result Value    RBC 3.93 (*)     Hemoglobin 11.6 (*)     Hematocrit 34.8 (*)     RDW 15.2 (*)     Gran # (ANC) 7.8 (*)     All other components within normal limits   COMPREHENSIVE METABOLIC PANEL - Abnormal; Notable for the following:     Potassium 3.4 (*)     Glucose 112 (*)     BUN, Bld 26 (*)     Creatinine 1.5 (*)     Alkaline Phosphatase 54 (*)     AST 62 (*)     eGFR if  48 (*)     eGFR if non  41 (*)     All other components within normal limits   URINALYSIS - Abnormal; Notable for the following:     Protein, UA 1+ (*)     Glucose, UA 2+  (*)     Occult Blood UA 3+ (*)     All other components within normal limits   URINALYSIS MICROSCOPIC - Abnormal; Notable for the following:     RBC, UA >100 (*)     WBC, UA 8 (*)     Bacteria, UA Few (*)     All other components within normal limits   POCT GLUCOSE - Abnormal; Notable for the following:     POCT Glucose 112 (*)     All other components within normal limits   POCT GLUCOSE MONITORING CONTINUOUS - Abnormal; Notable for the following:     POC Glucose 112 (*)     All other components within normal limits   TROPONIN I   CK-MB   CK                                  Clinical Impression:   The primary encounter diagnosis was Left ureteral stone. A diagnosis of Flank pain was also pertinent to this visit.          Change of Shift  -- This patient was taken over by  at shift change  -- Workup is pending, oncoming physician will follow-up and give disposition  -- Pt is completely stable and appropriate handoff was given by the outgoing MD Tyler Alejandro MD  04/04/18 7045

## 2018-05-04 ENCOUNTER — CLINICAL SUPPORT (OUTPATIENT)
Dept: SMOKING CESSATION | Facility: CLINIC | Age: 48
End: 2018-05-04
Payer: COMMERCIAL

## 2018-05-04 DIAGNOSIS — F17.200 NICOTINE DEPENDENCE: Primary | ICD-10-CM

## 2018-05-04 PROCEDURE — 99407 BEHAV CHNG SMOKING > 10 MIN: CPT | Mod: S$GLB,,,

## 2018-05-04 NOTE — PROGRESS NOTES
Called pt to f/u on her 3 and 6 month smoking cessation quit status. Pt is still smoking. Pt stated she is not interested in rejoining and that the only thing that works for her is vaping. Informed her she has benefits available and if she changes her mind to call us back. Will call back for 1 yr f/u.

## 2018-05-09 ENCOUNTER — HOSPITAL ENCOUNTER (EMERGENCY)
Facility: HOSPITAL | Age: 48
Discharge: HOME | End: 2018-05-10
Attending: SURGERY
Payer: MEDICAID

## 2018-05-09 VITALS
HEART RATE: 109 BPM | TEMPERATURE: 97 F | BODY MASS INDEX: 39.06 KG/M2 | SYSTOLIC BLOOD PRESSURE: 140 MMHG | DIASTOLIC BLOOD PRESSURE: 71 MMHG | WEIGHT: 242 LBS | OXYGEN SATURATION: 99 % | RESPIRATION RATE: 16 BRPM

## 2018-05-09 DIAGNOSIS — R73.9 HYPERGLYCEMIA: Primary | ICD-10-CM

## 2018-05-09 LAB
ALBUMIN SERPL BCP-MCNC: 3.9 G/DL
ALP SERPL-CCNC: 78 U/L
ALT SERPL W/O P-5'-P-CCNC: 33 U/L
AMPHET+METHAMPHET UR QL: NEGATIVE
ANION GAP SERPL CALC-SCNC: 20 MMOL/L
AST SERPL-CCNC: 35 U/L
B-OH-BUTYR BLD STRIP-SCNC: 0.3 MMOL/L
BACTERIA #/AREA URNS HPF: NORMAL /HPF
BARBITURATES UR QL SCN>200 NG/ML: NEGATIVE
BENZODIAZ UR QL SCN>200 NG/ML: NEGATIVE
BILIRUB SERPL-MCNC: 0.4 MG/DL
BILIRUB UR QL STRIP: NEGATIVE
BUN SERPL-MCNC: 17 MG/DL
BZE UR QL SCN: NEGATIVE
CALCIUM SERPL-MCNC: 11.1 MG/DL
CANNABINOIDS UR QL SCN: NEGATIVE
CHLORIDE SERPL-SCNC: 91 MMOL/L
CLARITY UR: CLEAR
CO2 SERPL-SCNC: 21 MMOL/L
COLOR UR: YELLOW
CREAT SERPL-MCNC: 1.4 MG/DL
CREAT UR-MCNC: 34.5 MG/DL
EST. GFR  (AFRICAN AMERICAN): 52 ML/MIN/1.73 M^2
EST. GFR  (NON AFRICAN AMERICAN): 45 ML/MIN/1.73 M^2
GLUCOSE SERPL-MCNC: 353 MG/DL (ref 70–110)
GLUCOSE SERPL-MCNC: 473 MG/DL (ref 70–110)
GLUCOSE SERPL-MCNC: 543 MG/DL
GLUCOSE UR QL STRIP: ABNORMAL
HGB UR QL STRIP: NEGATIVE
HYALINE CASTS #/AREA URNS LPF: 0 /LPF
KETONES UR QL STRIP: NEGATIVE
LEUKOCYTE ESTERASE UR QL STRIP: NEGATIVE
MAGNESIUM SERPL-MCNC: 1.5 MG/DL
METHADONE UR QL SCN>300 NG/ML: NEGATIVE
MICROSCOPIC COMMENT: NORMAL
NITRITE UR QL STRIP: NEGATIVE
OPIATES UR QL SCN: NEGATIVE
PCP UR QL SCN>25 NG/ML: NEGATIVE
PH UR STRIP: 6 [PH] (ref 5–8)
PHOSPHATE SERPL-MCNC: 3.8 MG/DL
POCT GLUCOSE: 353 MG/DL (ref 70–110)
POCT GLUCOSE: 473 MG/DL (ref 70–110)
POTASSIUM SERPL-SCNC: 3.9 MMOL/L
PROT SERPL-MCNC: 9.2 G/DL
PROT UR QL STRIP: ABNORMAL
RBC #/AREA URNS HPF: 0 /HPF (ref 0–4)
SODIUM SERPL-SCNC: 132 MMOL/L
SP GR UR STRIP: 1.01 (ref 1–1.03)
TOXICOLOGY INFORMATION: NORMAL
URN SPEC COLLECT METH UR: ABNORMAL
UROBILINOGEN UR STRIP-ACNC: NEGATIVE EU/DL
WBC #/AREA URNS HPF: 0 /HPF (ref 0–5)
YEAST URNS QL MICRO: NORMAL

## 2018-05-09 PROCEDURE — 84100 ASSAY OF PHOSPHORUS: CPT

## 2018-05-09 PROCEDURE — 80053 COMPREHEN METABOLIC PANEL: CPT

## 2018-05-09 PROCEDURE — 96374 THER/PROPH/DIAG INJ IV PUSH: CPT

## 2018-05-09 PROCEDURE — 81000 URINALYSIS NONAUTO W/SCOPE: CPT

## 2018-05-09 PROCEDURE — 82962 GLUCOSE BLOOD TEST: CPT

## 2018-05-09 PROCEDURE — 83735 ASSAY OF MAGNESIUM: CPT

## 2018-05-09 PROCEDURE — 63600175 PHARM REV CODE 636 W HCPCS: Performed by: SURGERY

## 2018-05-09 PROCEDURE — 80307 DRUG TEST PRSMV CHEM ANLYZR: CPT

## 2018-05-09 PROCEDURE — 82010 KETONE BODYS QUAN: CPT

## 2018-05-09 PROCEDURE — 99283 EMERGENCY DEPT VISIT LOW MDM: CPT | Mod: 25

## 2018-05-09 PROCEDURE — 36415 COLL VENOUS BLD VENIPUNCTURE: CPT

## 2018-05-09 PROCEDURE — 25000003 PHARM REV CODE 250: Performed by: SURGERY

## 2018-05-09 PROCEDURE — 96361 HYDRATE IV INFUSION ADD-ON: CPT

## 2018-05-09 RX ORDER — SODIUM CHLORIDE 9 MG/ML
1000 INJECTION, SOLUTION INTRAVENOUS
Status: COMPLETED | OUTPATIENT
Start: 2018-05-09 | End: 2018-05-09

## 2018-05-09 RX ADMIN — SODIUM CHLORIDE 1000 ML: 0.9 INJECTION, SOLUTION INTRAVENOUS at 10:05

## 2018-05-09 RX ADMIN — INSULIN HUMAN 6 UNITS: 100 INJECTION, SOLUTION PARENTERAL at 10:05

## 2018-05-10 NOTE — ED TRIAGE NOTES
Pt reports used last of insulin this AM, pharmacy out of insulin till tomorrow, pt reports cbg in upper 400's at home. cbg 473 in triage

## 2018-05-10 NOTE — ED PROVIDER NOTES
[facility]                                                 Chief Complaint  47 y.o. female with Hyperglycemia    History of Present Illness  Hue Hernandez presents to the emergency room with high blood sugar tonight  Patient checked her blood sugar at home and noticed that it was over 500 PTA  Patient has been out of her diabetes medicine, the prescriptions at the pharmacy  Patient states that she wanted her blood sugar checked tonight, asymptomatic now  Patient has no evidence of diabetic ketoacidosis, normal vital signs noted tonight    The history is provided by the patient    Past Medical History   -- Anticoagulant long-term use    -- Arthritis    -- COPD (chronic obstructive pulmonary disease)    -- Cubital tunnel syndrome on right    -- Depression    -- Diabetes mellitus    -- Diabetes mellitus, type 2    -- Diabetic peripheral neuropathy    -- Encounter for blood transfusion    -- GERD (gastroesophageal reflux disease)    -- Glaucoma suspect    -- Glaucoma suspect of both eyes    -- High cholesterol    -- History of appendectomy    -- Hyperlipemia    -- Hypertension    -- Muscle spasms of both lower extremities    -- Neuropathy    -- Obesity    -- Radiation thyroiditis    -- Sensory peripheral neuropathy    -- Stroke      Surgeries: Appendectomy, carpal to release,  as well as cholecystectomy, D&C, eye surgery, BTL  ALLERGIES: Latex, sprayed with, iodine and adhesive tape-silicones    Review of Systems and Physical Exam      Review of Systems  -- Constitution - no fever, denies fatigue, no weakness, no chills  -- Eyes - no tearing or redness, no visual disturbance  -- Ear, Nose - no tinnitus or earache, no nasal congestion or discharge  -- Mouth,Throat - no sore throat, no toothache, normal voice, normal swallowing  -- Respiratory - denies cough and congestion, no shortness of breath, no LIRA  -- Cardiovascular - denies chest pain, no palpitations, denies claudication  --  Gastrointestinal - denies abdominal pain, nausea, vomiting, or diarrhea  -- Genitourinary - no dysuria, no denies flank pain, no hematuria or frequency   -- Musculoskeletal - denies back pain, negative for myalgias and arthralgias   -- Neurological - no headache, denies weakness or seizure; no LOC  -- Skin - denies pallor, rash, or changes in skin. no hives or welts noted    BP (!) 157/75  Pulse (!) 112   Temp 97.4 °F (36.3 °C) (Oral)   Resp 18      Physical Exam  -- Nursing note and vitals reviewed  -- Head: Atraumatic. Normocephalic. No obvious abnormality  -- Eyes: Pupils are equal and reactive to light. Normal conjunctiva and lids  -- Cardiac: Normal rate, regular rhythm and normal heart sounds  -- Pulmonary: Normal respiratory effort, breath sounds clear to auscultation  -- Abdominal: Soft, no tenderness. Normal bowel sounds. Normal liver edge  -- Musculoskeletal: Normal range of motion, no effusions. Joints stable   -- Neurological: No focal deficits. Showed good interaction with staff  -- Vascular: Posterior tibial, dorsalis pedis and radial pulses 2+ bilaterally      Emergency Room Course      Labs   (L)   K 3.9   CL 91 (L)   CO2 21 (L)   BUN 17   CREATININE 1.4    (HH)   ALKPHOS 78   AST 35   ALT 33   BILITOT 0.4   ALBUMIN 3.9   PROT 9.2 (H)   WBC 12.30   HGB 11.6 (L)   HCT 34.8 (L)      CPK 86   CPKMB 2.0   TROPONINI <0.006   MG 1.5 (L)   TSH 1.030     Urinalysis  -- Urinalysis performed during this ER visit showed no signs of infection     EKG  -- The EKG findings today were without concerning findings from baseline    Additional Work up  -- Serum ketones are negative    Medications Given  -- 0.9%  NaCl infusion (0 mLs Intravenous Stopped 5/9/18 7773)   -- insulin regular injection 6 Units (6 Units Intravenous Given 5/9/18 2384)     Medical Decision Making  -- Diagnosis management comments: 47 y.o. female with hyperglycemia  -- Patient was successfully treated in the emergency room,  asymptomatic  -- States she is picking up her prescription for diabetes mellitus in the a.m.    Diagnosis  -- The encounter diagnosis was Hyperglycemia.    Disposition and Plan  -- Disposition: home  -- Condition: stable  -- Follow-up: Patient to follow up with Tawanda Monreal MD in 1-2 days.  -- I advised the patient that we have found no life threatening condition today  -- At this time, I believe the patient is clinically stable for discharge.   -- The patient acknowledges that close follow up with a MD is required   -- Patient agrees to comply with all instruction and direction given in the ER    This note is dictated on Dragon Natural Speaking word recognition program.  There are word recognition mistakes that are occasionally missed on review.            Tyler Alejandro MD  05/09/18 2351

## 2018-06-25 PROBLEM — Z09 HOSPITAL DISCHARGE FOLLOW-UP: Status: RESOLVED | Noted: 2018-03-22 | Resolved: 2018-06-25

## 2018-08-22 ENCOUNTER — CLINICAL SUPPORT (OUTPATIENT)
Dept: SMOKING CESSATION | Facility: CLINIC | Age: 48
End: 2018-08-22
Payer: COMMERCIAL

## 2018-08-22 DIAGNOSIS — F17.200 NICOTINE DEPENDENCE: Primary | ICD-10-CM

## 2018-08-22 PROCEDURE — 99407 BEHAV CHNG SMOKING > 10 MIN: CPT | Mod: S$GLB,,,

## 2018-08-22 NOTE — PROGRESS NOTES
Called pt to f/u on her 12 month smoking cessation quit status. Pt stated she is still smoking. Informed her she has benefits available and is able to rejoin. Pt not ready to make appointment. She will call back when ready. Will resolve episode.

## 2018-10-01 PROBLEM — G56.02 LEFT CARPAL TUNNEL SYNDROME: Status: ACTIVE | Noted: 2018-10-01

## 2019-02-06 ENCOUNTER — HOSPITAL ENCOUNTER (EMERGENCY)
Facility: HOSPITAL | Age: 49
Discharge: HOME | End: 2019-02-06
Attending: FAMILY MEDICINE
Payer: MEDICAID

## 2019-02-06 VITALS
BODY MASS INDEX: 34.44 KG/M2 | TEMPERATURE: 97 F | OXYGEN SATURATION: 97 % | RESPIRATION RATE: 17 BRPM | DIASTOLIC BLOOD PRESSURE: 69 MMHG | WEIGHT: 213.38 LBS | SYSTOLIC BLOOD PRESSURE: 118 MMHG | HEART RATE: 95 BPM

## 2019-02-06 DIAGNOSIS — S76.012A MUSCLE STRAIN OF LEFT HIP, INITIAL ENCOUNTER: Primary | ICD-10-CM
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
## 2019-02-06 PROCEDURE — 99284 EMERGENCY DEPT VISIT MOD MDM: CPT

## 2019-02-06 PROCEDURE — 25000003 PHARM REV CODE 250: Performed by: FAMILY MEDICINE

## 2019-02-06 RX ORDER — NAPROXEN 500 MG/1
500 TABLET ORAL 2 TIMES DAILY WITH MEALS
Qty: 60 TABLET | Refills: 0 | Status: SHIPPED | OUTPATIENT
Start: 2019-02-06 | End: 2019-07-18

## 2019-02-06 RX ORDER — KETOROLAC TROMETHAMINE 10 MG/1
10 TABLET, FILM COATED ORAL
Status: COMPLETED | OUTPATIENT
Start: 2019-02-06 | End: 2019-02-06

## 2019-02-06 RX ORDER — CYCLOBENZAPRINE HCL 10 MG
10 TABLET ORAL 3 TIMES DAILY PRN
Qty: 15 TABLET | Refills: 0 | Status: SHIPPED | OUTPATIENT
Start: 2019-02-06 | End: 2019-02-11

## 2019-02-06 RX ADMIN — KETOROLAC TROMETHAMINE 10 MG: 10 TABLET, FILM COATED ORAL at 10:02

## 2019-02-07 NOTE — ED NOTES
Discharged to home/self care.    - Condition at discharge: Good  - Mode of Discharge: Ambulatory  - The patient left the ED accompanied by a family member.  - The discharge instructions were discussed with the patient.  - She stated  an understanding of the discharge instructions.  - Walked pt to the discharge station.

## 2019-02-07 NOTE — ED TRIAGE NOTES
48 y.o. female presents to ER ED 02/ED 02B   Chief Complaint   Patient presents with    Hip Pain   . No acute distress noted.  Pt given a hospital gown with instructions to remove clothing and/or jewelry and to place gown on and voices understanding. Instructed to call if help is needed and agrees.

## 2019-02-07 NOTE — DISCHARGE INSTRUCTIONS
Take all medications as prescribed and follow up with primary care doctor for further evaluation and treatment.  Return to the emergency department for any new or worsening conditions.

## 2019-02-07 NOTE — ED PROVIDER NOTES
Encounter Date: 2019       History     Chief Complaint   Patient presents with    Hip Pain     Patient is a 48-year-old female who presents to emergency department with complaints of left hip pain.  States that she tripped and fell at [employer].  Falling to her left elbow and left hip on the ground.  No obvious deformity skin changes or bruising evident.  Currently the pain bothers her is in the left hip anteriorly into the left groin.  She is able to bear weight.          Review of patient's allergies indicates:   Allergen Reactions    Latex, natural rubber Other (See Comments)     Latex condoms burning pain.      Spiriva respimat [tiotropium bromide] Hives    Iodine      Blisters (mouth)^  Blisters (mouth)^    Adhesive tape-silicones Rash     Use paper tape  If necessary     Past Medical History:   Diagnosis Date    Anticoagulant long-term use     Arthritis     Back pain     Carpal tunnel syndrome     left    COPD (chronic obstructive pulmonary disease)     Cubital tunnel syndrome     left    Cubital tunnel syndrome on right     Depression     Diabetes mellitus     Diabetes mellitus, type 2     Diabetic peripheral neuropathy     Encounter for blood transfusion     GERD (gastroesophageal reflux disease)     Glaucoma suspect     Glaucoma suspect of both eyes     High cholesterol     Hip pain     History of appendectomy     Hyperlipemia     Hypertension     Muscle spasms of both lower extremities     Neck pain     Neuropathy     Obesity     KENTRELL on CPAP     Radiation thyroiditis     Sensory peripheral neuropathy     Stroke      tia 3-4 years ago - states has had 2 strokes - problems with decision making     Past Surgical History:   Procedure Laterality Date    APPENDECTOMY      CARPAL TUNNEL RELEASE Right      SECTION  , 2002    x2    CHOLECYSTECTOMY      Decompression, Right Ulnar Nerve Right 2015    Performed [facility] at [facility]    DILATION AND  CURETTAGE OF UTERUS      EYE SURGERY      RELEASE, CARPAL TUNNEL Left 10/18/2018    Performed [facility]z [facility] at [facility] [place]    RELEASE, CUBITAL TUNNEL Left 10/18/2018    Performed by [facility]iguez MD at [facility] [place]    TUBAL LIGATION       Family History   Problem Relation Age of Onset    Diabetes Mother     Coronary artery disease Mother     Heart attack Mother     Hypertension Mother     Thyroid disease Father     Diabetes Father     Hypertension Father     Cirrhosis Father     Hepatitis Father      Social History     Tobacco Use    Smoking status: Current Every Day Smoker     Packs/day: 1.00     Years: 30.00     Pack years: 30.00     Types: Vaping with nicotine, Cigarettes     Last attempt to quit: 2017     Years since quittin.1    Smokeless tobacco: Never Used   Substance Use Topics    Alcohol use: No     Alcohol/week: 0.0 oz    Drug use: No     Review of Systems   Constitutional: Negative for activity change, appetite change and fever.   HENT: Negative for sore throat.    Respiratory: Negative for cough, choking, chest tightness and shortness of breath.    Cardiovascular: Negative for chest pain, palpitations and leg swelling.   Gastrointestinal: Negative for nausea.   Genitourinary: Negative for dysuria.   Musculoskeletal: Positive for arthralgias. Negative for back pain.   Skin: Negative for color change, pallor and rash.   Neurological: Negative for weakness.   Hematological: Does not bruise/bleed easily.   All other systems reviewed and are negative.      Physical Exam     Initial Vitals [198]   BP Pulse Resp Temp SpO2   131/70 (!) 113 18 97.1 °F (36.2 °C) 97 %      MAP       --         Physical Exam    Nursing note and vitals reviewed.  Constitutional: She appears well-developed and well-nourished. She is not diaphoretic. No distress.   HENT:   Head: Normocephalic and atraumatic.   Eyes: EOM are normal. Pupils are equal, round, and reactive to light.   Neck:  Normal range of motion. Neck supple.   Cardiovascular: Normal rate and regular rhythm.   Pulmonary/Chest: Breath sounds normal.   Abdominal: Soft. Bowel sounds are normal.   Musculoskeletal:        Left hip: She exhibits tenderness. She exhibits normal range of motion, normal strength, no bony tenderness, no swelling, no crepitus and no deformity.        Legs:  Neurological: She is alert and oriented to person, place, and time. She has normal strength. GCS score is 15. GCS eye subscore is 4. GCS verbal subscore is 5. GCS motor subscore is 6.   Skin: Skin is warm and dry. Capillary refill takes less than 2 seconds.         ED Course   Procedures  Labs Reviewed - No data to display       Imaging Results    None                               Clinical Impression:   The primary encounter diagnosis was Muscle strain of left hip, initial encounter. A diagnosis of Fall was also pertinent to this visit.                             Geovany Nagy MD  02/07/19 0234

## 2019-03-18 PROBLEM — E88.9 PERIPHERAL NEUROPATHY DUE TO METABOLIC DISORDER: Status: ACTIVE | Noted: 2019-03-18

## 2019-03-18 PROBLEM — G63 PERIPHERAL NEUROPATHY DUE TO METABOLIC DISORDER: Status: ACTIVE | Noted: 2019-03-18

## 2019-05-02 ENCOUNTER — PATIENT OUTREACH (OUTPATIENT)
Dept: ADMINISTRATIVE | Facility: HOSPITAL | Age: 49
End: 2019-05-02

## 2019-07-30 PROBLEM — E11.22 CKD STAGE 3 DUE TO TYPE 2 DIABETES MELLITUS: Status: ACTIVE | Noted: 2019-07-30

## 2019-07-30 PROBLEM — N18.30 CKD STAGE 3 DUE TO TYPE 2 DIABETES MELLITUS: Status: ACTIVE | Noted: 2019-07-30

## 2020-02-06 PROBLEM — G24.01 TARDIVE DYSKINESIA: Status: ACTIVE | Noted: 2020-02-06

## 2020-02-18 ENCOUNTER — CLINICAL SUPPORT (OUTPATIENT)
Dept: SMOKING CESSATION | Facility: CLINIC | Age: 50
End: 2020-02-18
Payer: COMMERCIAL

## 2020-02-18 DIAGNOSIS — F17.200 NICOTINE DEPENDENCE: Primary | ICD-10-CM

## 2020-02-18 PROCEDURE — 99406 BEHAV CHNG SMOKING 3-10 MIN: CPT | Mod: S$GLB,,,

## 2020-02-18 PROCEDURE — 99406 PR TOBACCO USE CESSATION INTERMEDIATE 3-10 MINUTES: ICD-10-PCS | Mod: S$GLB,,,

## 2020-02-22 ENCOUNTER — HOSPITAL ENCOUNTER (EMERGENCY)
Facility: HOSPITAL | Age: 50
Discharge: HOME | End: 2020-02-22
Attending: SURGERY
Payer: MEDICAID

## 2020-02-22 VITALS
RESPIRATION RATE: 19 BRPM | WEIGHT: 193 LBS | SYSTOLIC BLOOD PRESSURE: 133 MMHG | HEART RATE: 104 BPM | OXYGEN SATURATION: 99 % | BODY MASS INDEX: 31.15 KG/M2 | TEMPERATURE: 97 F | DIASTOLIC BLOOD PRESSURE: 70 MMHG

## 2020-02-22 DIAGNOSIS — F10.929 ALCOHOLIC INTOXICATION WITH COMPLICATION: Primary | ICD-10-CM

## 2020-02-22 DIAGNOSIS — R73.9 HYPERGLYCEMIA: ICD-10-CM

## 2020-02-22 DIAGNOSIS — F10.229: ICD-10-CM

## 2020-02-22 LAB
ALBUMIN SERPL BCP-MCNC: 3.7 G/DL (ref 3.5–5.2)
ALP SERPL-CCNC: 67 U/L (ref 55–135)
ALT SERPL W/O P-5'-P-CCNC: 20 U/L (ref 10–44)
AMPHET+METHAMPHET UR QL: NEGATIVE
ANION GAP SERPL CALC-SCNC: 24 MMOL/L (ref 8–16)
AST SERPL-CCNC: 12 U/L (ref 10–40)
B-OH-BUTYR BLD STRIP-SCNC: 0.2 MMOL/L (ref 0–0.5)
BARBITURATES UR QL SCN>200 NG/ML: NEGATIVE
BASOPHILS # BLD AUTO: 0.13 K/UL (ref 0–0.2)
BASOPHILS NFR BLD: 0.8 % (ref 0–1.9)
BENZODIAZ UR QL SCN>200 NG/ML: NEGATIVE
BILIRUB SERPL-MCNC: 0.2 MG/DL (ref 0.1–1)
BILIRUB UR QL STRIP: NEGATIVE
BUN SERPL-MCNC: 14 MG/DL (ref 6–20)
BZE UR QL SCN: NEGATIVE
CALCIUM SERPL-MCNC: 9.6 MG/DL (ref 8.7–10.5)
CANNABINOIDS UR QL SCN: NEGATIVE
CHLORIDE SERPL-SCNC: 94 MMOL/L (ref 95–110)
CK MB SERPL-MCNC: 2.4 NG/ML (ref 0.1–6.5)
CK MB SERPL-RTO: 1.9 % (ref 0–5)
CK SERPL-CCNC: 125 U/L (ref 20–180)
CK SERPL-CCNC: 125 U/L (ref 20–180)
CLARITY UR: CLEAR
CO2 SERPL-SCNC: 17 MMOL/L (ref 23–29)
COLOR UR: YELLOW
CREAT SERPL-MCNC: 1.5 MG/DL (ref 0.5–1.4)
CREAT UR-MCNC: 16.2 MG/DL (ref 15–325)
DIFFERENTIAL METHOD: ABNORMAL
EOSINOPHIL # BLD AUTO: 0.6 K/UL (ref 0–0.5)
EOSINOPHIL NFR BLD: 3.5 % (ref 0–8)
ERYTHROCYTE [DISTWIDTH] IN BLOOD BY AUTOMATED COUNT: 16 % (ref 11.5–14.5)
EST. GFR  (AFRICAN AMERICAN): 47 ML/MIN/1.73 M^2
EST. GFR  (NON AFRICAN AMERICAN): 41 ML/MIN/1.73 M^2
ETHANOL SERPL-MCNC: 275 MG/DL
GLUCOSE SERPL-MCNC: 510 MG/DL (ref 70–110)
GLUCOSE SERPL-MCNC: >345 MG/DL (ref 70–110)
GLUCOSE UR QL STRIP: ABNORMAL
HCT VFR BLD AUTO: 37.7 % (ref 37–48.5)
HGB BLD-MCNC: 12.5 G/DL (ref 12–16)
HGB UR QL STRIP: NEGATIVE
IMM GRANULOCYTES # BLD AUTO: 0.09 K/UL (ref 0–0.04)
IMM GRANULOCYTES NFR BLD AUTO: 0.6 % (ref 0–0.5)
KETONES UR QL STRIP: NEGATIVE
LEUKOCYTE ESTERASE UR QL STRIP: NEGATIVE
LYMPHOCYTES # BLD AUTO: 5.1 K/UL (ref 1–4.8)
LYMPHOCYTES NFR BLD: 32 % (ref 18–48)
MCH RBC QN AUTO: 27.5 PG (ref 27–31)
MCHC RBC AUTO-ENTMCNC: 33.2 G/DL (ref 32–36)
MCV RBC AUTO: 83 FL (ref 82–98)
METHADONE UR QL SCN>300 NG/ML: NEGATIVE
MONOCYTES # BLD AUTO: 0.9 K/UL (ref 0.3–1)
MONOCYTES NFR BLD: 5.4 % (ref 4–15)
NEUTROPHILS # BLD AUTO: 9.1 K/UL (ref 1.8–7.7)
NEUTROPHILS NFR BLD: 57.7 % (ref 38–73)
NITRITE UR QL STRIP: NEGATIVE
NRBC BLD-RTO: 0 /100 WBC
OPIATES UR QL SCN: NEGATIVE
PCP UR QL SCN>25 NG/ML: NEGATIVE
PH UR STRIP: 5 [PH] (ref 5–8)
PLATELET # BLD AUTO: 310 K/UL (ref 150–350)
PMV BLD AUTO: 11.3 FL (ref 9.2–12.9)
POCT GLUCOSE: 374 MG/DL (ref 70–110)
POTASSIUM SERPL-SCNC: 3 MMOL/L (ref 3.5–5.1)
PROT SERPL-MCNC: 7.7 G/DL (ref 6–8.4)
PROT UR QL STRIP: NEGATIVE
RBC # BLD AUTO: 4.54 M/UL (ref 4–5.4)
SODIUM SERPL-SCNC: 135 MMOL/L (ref 136–145)
SP GR UR STRIP: <=1.005 (ref 1–1.03)
TOXICOLOGY INFORMATION: NORMAL
TROPONIN I SERPL DL<=0.01 NG/ML-MCNC: 0.01 NG/ML (ref 0–0.03)
URN SPEC COLLECT METH UR: ABNORMAL
UROBILINOGEN UR STRIP-ACNC: NEGATIVE EU/DL
WBC # BLD AUTO: 15.84 K/UL (ref 3.9–12.7)

## 2020-02-22 PROCEDURE — 96361 HYDRATE IV INFUSION ADD-ON: CPT

## 2020-02-22 PROCEDURE — 80307 DRUG TEST PRSMV CHEM ANLYZR: CPT

## 2020-02-22 PROCEDURE — 84484 ASSAY OF TROPONIN QUANT: CPT

## 2020-02-22 PROCEDURE — 80053 COMPREHEN METABOLIC PANEL: CPT

## 2020-02-22 PROCEDURE — 82010 KETONE BODYS QUAN: CPT

## 2020-02-22 PROCEDURE — 93010 EKG 12-LEAD: ICD-10-PCS | Mod: ,,, | Performed by: INTERNAL MEDICINE

## 2020-02-22 PROCEDURE — 82962 GLUCOSE BLOOD TEST: CPT

## 2020-02-22 PROCEDURE — 93005 ELECTROCARDIOGRAM TRACING: CPT

## 2020-02-22 PROCEDURE — 82553 CREATINE MB FRACTION: CPT

## 2020-02-22 PROCEDURE — 81003 URINALYSIS AUTO W/O SCOPE: CPT | Mod: 59

## 2020-02-22 PROCEDURE — 80320 DRUG SCREEN QUANTALCOHOLS: CPT

## 2020-02-22 PROCEDURE — 99284 EMERGENCY DEPT VISIT MOD MDM: CPT | Mod: 25

## 2020-02-22 PROCEDURE — 96374 THER/PROPH/DIAG INJ IV PUSH: CPT

## 2020-02-22 PROCEDURE — 63600175 PHARM REV CODE 636 W HCPCS: Performed by: SURGERY

## 2020-02-22 PROCEDURE — 93010 ELECTROCARDIOGRAM REPORT: CPT | Mod: ,,, | Performed by: INTERNAL MEDICINE

## 2020-02-22 PROCEDURE — 82550 ASSAY OF CK (CPK): CPT

## 2020-02-22 PROCEDURE — 36415 COLL VENOUS BLD VENIPUNCTURE: CPT

## 2020-02-22 PROCEDURE — 85025 COMPLETE CBC W/AUTO DIFF WBC: CPT

## 2020-02-22 RX ORDER — SODIUM CHLORIDE 9 MG/ML
1000 INJECTION, SOLUTION INTRAVENOUS
Status: COMPLETED | OUTPATIENT
Start: 2020-02-22 | End: 2020-02-22

## 2020-02-22 RX ORDER — SODIUM CHLORIDE 9 MG/ML
1000 INJECTION, SOLUTION INTRAVENOUS CONTINUOUS
Status: DISCONTINUED | OUTPATIENT
Start: 2020-02-22 | End: 2020-02-23 | Stop reason: HOSPADM

## 2020-02-22 RX ADMIN — SODIUM CHLORIDE 1000 ML: 0.9 INJECTION, SOLUTION INTRAVENOUS at 10:02

## 2020-02-22 RX ADMIN — INSULIN HUMAN 10 UNITS: 100 INJECTION, SOLUTION PARENTERAL at 10:02

## 2020-02-22 RX ADMIN — SODIUM CHLORIDE 1000 ML: 0.9 INJECTION, SOLUTION INTRAVENOUS at 09:02

## 2020-02-23 NOTE — ED PROVIDER NOTES
[facility]                                                 Chief Complaint  49 y.o. female with Alcohol Intoxication and Hyperglycemia    History of Present Illness  Hue Hernandez presents to the emergency room with alcohol intoxication  Was at a local bar drinking when she fell to the floor, no loss of consciousness  Patient admits to drinking alcohol all day with a blood alcohol of 280 on triage  Patient also has a blood sugar of almost 500, noncompliant diabetic by history    The history is provided by the patient   device was not used during this ER visit  Medical history:  Glaucoma, COPD, arthritis, back pain, neuropathy, chronic pain, stroke  Surgeries:  Appendectomy, carpal tunnel, D/C, , ulnar release, BTL, gallbladder  Allergies:  Latex, Spiriva, iodine, adhesives    I have reviewed all of this patient's past medical, surgical, family, and social   histories as well as active allergies and medications documented in the  electronic medical record    Review of Systems and Physical Exam      Review of Systems  -- Constitution - no fever, denies fatigue, no weakness, no chills  -- Eyes - no tearing or redness, no visual disturbance  -- Ear, Nose - no tinnitus or earache, no nasal congestion or discharge  -- Mouth,Throat - no sore throat, no toothache, normal voice, normal swallowing  -- Respiratory - denies cough and congestion, no shortness of breath, no LIRA  -- Cardiovascular - denies chest pain, no palpitations, denies claudication  -- Gastrointestinal - denies abdominal pain, nausea, vomiting, or diarrhea  -- Genitourinary - no dysuria, denies flank pain, no hematuria, no STD risk  -- Musculoskeletal - denies back pain, negative for trauma or injury  -- Neurological - no headache, denies weakness or seizure; no LOC  -- Skin - denies pallor, rash, or changes in skin. no hives or welts noted  -- Psychiatric - alcohol intoxication with no suicidal or  homicidal ideation    Vital Signs  Her tympanic temperature is 97.4 °F (36.3 °C).   Her blood pressure is 133/70 and her pulse is 104.   Her respiration is 19 and oxygen saturation is 99%.     Physical Exam  -- Nursing note and vitals reviewed  -- Constitutional: Appears well-developed and well-nourished  -- Head: Atraumatic. Normocephalic. No obvious abnormality  -- Eyes: Pupils are equal and reactive to light. Normal conjunctiva and lids  -- Cardiac: Normal rate, regular rhythm and normal heart sounds  -- Pulmonary: Normal respiratory effort, breath sounds clear to auscultation  -- Abdominal: Soft, no tenderness. Normal bowel sounds. Normal liver edge  -- Musculoskeletal: Normal range of motion, no effusions. Joints stable   -- Neurological: No focal deficits. Showed good interaction with staff  -- Vascular: Posterior tibial, dorsalis pedis and radial pulses 2+ bilaterally      Emergency Room Course      Lab results   (L)   K 3.0 (L)   CL 94 (L)   CO2 17 (L)   BUN 14   CREATININE 1.5 (H)    (HH)   ALKPHOS 67   AST 12   ALT 20   BILITOT 0.2   ALBUMIN 3.7   PROT 7.7   WBC 15.84 (H)   HGB 12.5   HCT 37.7            CPKMB 2.4   TROPONINI 0.010   TSH 0.086 (L)     Urinalysis  -- Urinalysis performed during this ER visit showed no signs of infection      EKG   -- The EKG findings today were without concerning findings from baseline     Additional Work up  -- serum ketones 0  -- repeat blood sugar was 375  -- blood alcohol level is greater than 200    Medications Given  0.9%  NaCl infusion (1,000 mLs Intravenous New Bag 2/22/20 2200)   0.9%  NaCl infusion (0 mLs Intravenous Stopped 2/22/20 2200)   insulin regular injection 10 Units (10 Units Intravenous Given 2/22/20 2200)     ED Physician Management  -- Diagnosis management comments: 49 y.o. female with alcohol intoxication  -- patient with a blood sugar over 500 without diabetic ketoacidosis today  -- patient was given IV fluids and IV  insulin with decrease in blood sugar  -- patient also has becoming more sober, has a sister at bedside tonight  -- sister would like to take the patient home, she is alert appropriate on DC  -- patient declines invitation alcohol and drug rehabilitation from the ER  -- patient states that she would no longer drink alcohol and follow diabetic diet  -- return to the ER with any concerning signs or symptoms after discharge    Diagnosis  -- Alcoholic intoxication   -- Hyperglycemia    Disposition and Plan  -- Disposition: home  -- Condition: stable  -- Follow-up: Patient to follow up with Tawanda Monreal MD in 1-2 days.  -- I advised the patient that we have found no life threatening condition today  -- At this time, I believe the patient is clinically stable for discharge.   -- The patient acknowledges that close follow up with a MD is required   -- Patient agrees to comply with all instruction and direction given in the ER    This note is dictated on M*Modal word recognition program.  There are word recognition mistakes that are occasionally missed on review.         Tyler Alejandro MD  02/22/20 4874

## 2020-10-21 PROBLEM — I45.10 INCOMPLETE RBBB: Status: ACTIVE | Noted: 2020-10-21

## 2020-10-22 PROBLEM — I51.7 LEFT ATRIAL ENLARGEMENT: Status: ACTIVE | Noted: 2020-10-22

## 2020-10-22 PROBLEM — Z82.49 FAMILY HISTORY OF CARDIOVASCULAR DISEASE: Status: ACTIVE | Noted: 2020-10-22

## 2020-10-22 PROBLEM — E78.2 MIXED HYPERLIPIDEMIA: Status: ACTIVE | Noted: 2020-10-22

## 2020-10-22 PROBLEM — R00.2 PALPITATIONS: Status: ACTIVE | Noted: 2020-10-22

## 2020-10-22 PROBLEM — I51.7 LVH (LEFT VENTRICULAR HYPERTROPHY): Status: ACTIVE | Noted: 2020-10-22

## 2020-10-22 PROBLEM — R07.89 ATYPICAL CHEST PAIN: Status: ACTIVE | Noted: 2020-10-22

## 2021-03-01 PROBLEM — N18.31 STAGE 3A CHRONIC KIDNEY DISEASE: Status: ACTIVE | Noted: 2021-03-01

## 2021-03-01 PROBLEM — I51.89 DIASTOLIC DYSFUNCTION: Status: ACTIVE | Noted: 2021-03-01

## 2021-03-01 PROBLEM — R94.2 RESTRICTIVE PATTERN PRESENT ON PULMONARY FUNCTION TESTING: Status: ACTIVE | Noted: 2021-03-01

## 2021-03-01 PROBLEM — M79.605 BILATERAL LEG PAIN: Status: ACTIVE | Noted: 2021-03-01

## 2021-03-01 PROBLEM — G25.81 RESTLESS LEG SYNDROME: Status: ACTIVE | Noted: 2021-03-01

## 2021-03-01 PROBLEM — M79.604 BILATERAL LEG PAIN: Status: ACTIVE | Noted: 2021-03-01

## 2021-03-01 PROBLEM — I49.3 PVC'S (PREMATURE VENTRICULAR CONTRACTIONS): Status: ACTIVE | Noted: 2021-03-01

## 2021-03-01 PROBLEM — G47.33 OSA (OBSTRUCTIVE SLEEP APNEA): Status: ACTIVE | Noted: 2021-03-01

## 2021-03-01 PROBLEM — E11.69 HYPERLIPIDEMIA ASSOCIATED WITH TYPE 2 DIABETES MELLITUS: Status: RESOLVED | Noted: 2017-08-24 | Resolved: 2021-03-01

## 2021-03-01 PROBLEM — E78.5 HYPERLIPIDEMIA ASSOCIATED WITH TYPE 2 DIABETES MELLITUS: Status: RESOLVED | Noted: 2017-08-24 | Resolved: 2021-03-01

## 2021-03-01 PROBLEM — R94.2 DECREASED DIFFUSION CAPACITY OF LUNG: Status: ACTIVE | Noted: 2021-03-01

## 2021-03-16 ENCOUNTER — IMMUNIZATION (OUTPATIENT)
Dept: FAMILY MEDICINE | Facility: CLINIC | Age: 51
End: 2021-03-16
Payer: MEDICAID

## 2021-03-16 DIAGNOSIS — Z23 NEED FOR VACCINATION: Primary | ICD-10-CM

## 2021-03-16 PROCEDURE — 91300 COVID-19, MRNA, LNP-S, PF, 30 MCG/0.3 ML DOSE VACCINE: CPT | Mod: PBBFAC

## 2021-04-07 ENCOUNTER — IMMUNIZATION (OUTPATIENT)
Dept: FAMILY MEDICINE | Facility: CLINIC | Age: 51
End: 2021-04-07
Payer: MEDICAID

## 2021-04-07 DIAGNOSIS — Z23 NEED FOR VACCINATION: Primary | ICD-10-CM

## 2021-04-07 PROCEDURE — 0002A COVID-19, MRNA, LNP-S, PF, 30 MCG/0.3 ML DOSE VACCINE: CPT | Mod: PBBFAC

## 2021-04-07 PROCEDURE — 91300 COVID-19, MRNA, LNP-S, PF, 30 MCG/0.3 ML DOSE VACCINE: CPT | Mod: PBBFAC

## 2021-04-14 DIAGNOSIS — Z11.59 SPECIAL SCREENING EXAMINATION FOR UNSPECIFIED VIRAL DISEASE: ICD-10-CM

## 2021-04-17 ENCOUNTER — HOSPITAL ENCOUNTER (OUTPATIENT)
Dept: PREADMISSION TESTING | Facility: HOSPITAL | Age: 51
Discharge: HOME | End: 2021-04-17
Attending: INTERNAL MEDICINE
Payer: MEDICAID

## 2021-04-17 DIAGNOSIS — Z11.59 SPECIAL SCREENING EXAMINATION FOR UNSPECIFIED VIRAL DISEASE: ICD-10-CM

## 2021-04-17 PROCEDURE — U0003 INFECTIOUS AGENT DETECTION BY NUCLEIC ACID (DNA OR RNA); SEVERE ACUTE RESPIRATORY SYNDROME CORONAVIRUS 2 (SARS-COV-2) (CORONAVIRUS DISEASE [COVID-19]), AMPLIFIED PROBE TECHNIQUE, MAKING USE OF HIGH THROUGHPUT TECHNOLOGIES AS DESCRIBED BY CMS-2020-01-R: HCPCS | Performed by: INTERNAL MEDICINE

## 2021-04-17 PROCEDURE — U0005 INFEC AGEN DETEC AMPLI PROBE: HCPCS | Performed by: INTERNAL MEDICINE

## 2021-04-19 ENCOUNTER — HOSPITAL ENCOUNTER (OUTPATIENT)
Dept: SLEEP MEDICINE | Facility: HOSPITAL | Age: 51
Discharge: HOME | End: 2021-04-19
Attending: INTERNAL MEDICINE
Payer: MEDICAID

## 2021-04-19 DIAGNOSIS — G47.30 INSOMNIA WITH SLEEP APNEA: ICD-10-CM

## 2021-04-19 DIAGNOSIS — G47.00 INSOMNIA WITH SLEEP APNEA: ICD-10-CM

## 2021-04-19 LAB — SARS-COV-2 RNA RESP QL NAA+PROBE: NOT DETECTED

## 2021-04-19 PROCEDURE — 95811 POLYSOM 6/>YRS CPAP 4/> PARM: CPT

## 2021-08-02 PROBLEM — G47.33 OSA (OBSTRUCTIVE SLEEP APNEA): Status: RESOLVED | Noted: 2021-03-01 | Resolved: 2021-08-02

## 2021-08-02 PROBLEM — J84.9 INTERSTITIAL PULMONARY DISEASE, UNSPECIFIED: Status: ACTIVE | Noted: 2021-08-02

## 2021-08-02 PROBLEM — R06.02 SHORTNESS OF BREATH: Status: ACTIVE | Noted: 2021-08-02

## 2021-08-02 PROBLEM — R06.02 SHORTNESS OF BREATH: Status: RESOLVED | Noted: 2021-08-02 | Resolved: 2021-08-02

## 2021-10-11 ENCOUNTER — IMMUNIZATION (OUTPATIENT)
Dept: PRIMARY CARE CLINIC | Facility: CLINIC | Age: 51
End: 2021-10-11
Payer: MEDICAID

## 2021-10-11 DIAGNOSIS — Z23 NEED FOR VACCINATION: Primary | ICD-10-CM

## 2021-10-11 PROCEDURE — 0003A COVID-19, MRNA, LNP-S, PF, 30 MCG/0.3 ML DOSE VACCINE: CPT | Mod: CV19,PBBFAC | Performed by: INTERNAL MEDICINE

## 2021-10-11 PROCEDURE — 91300 COVID-19, MRNA, LNP-S, PF, 30 MCG/0.3 ML DOSE VACCINE: CPT | Mod: PBBFAC | Performed by: INTERNAL MEDICINE

## 2022-01-19 ENCOUNTER — LAB VISIT (OUTPATIENT)
Dept: FAMILY MEDICINE | Facility: CLINIC | Age: 52
End: 2022-01-19
Payer: MEDICAID

## 2022-01-19 DIAGNOSIS — Z11.52 ENCOUNTER FOR SCREENING FOR COVID-19: Primary | ICD-10-CM

## 2022-01-19 LAB
CTP QC/QA: YES
SARS-COV-2 AG RESP QL IA.RAPID: NEGATIVE

## 2022-01-19 PROCEDURE — 87811 SARS-COV-2 COVID19 W/OPTIC: CPT | Mod: QW,,, | Performed by: INTERNAL MEDICINE

## 2022-01-19 PROCEDURE — 87811 SARS CORONAVIRUS 2 ANTIGEN POCT, MANUAL READ: ICD-10-PCS | Mod: QW,,, | Performed by: INTERNAL MEDICINE

## 2022-01-19 NOTE — PROGRESS NOTES
Instructions for Patients with Confirmed or Suspected COVID-19    If you are awaiting your test result, you will either be called or it will be released to the patient portal.  If you have any questions about your test, please visit [email or web address] or call our COVID-19 information line at [phone].      Please isolate yourself at home.  You may leave home and/or return to work once the following conditions are met:    If you have symptoms and tested positive:   More than 5 days since symptoms first appeared AND   More than 24 hours fever free without medications AND       symptoms have improved   · For five days after ending isolation, masks are required.    If you had no symptoms but tested positive:   More than 5 days since the date of the first positive test. If you develop symptoms, then use the guidelines above  · For five days after ending isolation, masks are required.      Testing is not recommended if you are symptom free after completing isolation.

## 2022-01-27 ENCOUNTER — CLINICAL SUPPORT (OUTPATIENT)
Dept: SMOKING CESSATION | Facility: CLINIC | Age: 52
End: 2022-01-27
Payer: COMMERCIAL

## 2022-01-27 DIAGNOSIS — F17.200 NICOTINE DEPENDENCE: Primary | ICD-10-CM

## 2022-01-27 PROCEDURE — 99404 PREV MED CNSL INDIV APPRX 60: CPT | Mod: S$GLB,,,

## 2022-01-27 PROCEDURE — 99999 PR PBB SHADOW E&M-EST. PATIENT-LVL II: CPT | Mod: PBBFAC,,,

## 2022-01-27 PROCEDURE — 99999 PR PBB SHADOW E&M-EST. PATIENT-LVL II: ICD-10-PCS | Mod: PBBFAC,,,

## 2022-01-27 PROCEDURE — 99404 PR PREVENT COUNSEL,INDIV,60 MIN: ICD-10-PCS | Mod: S$GLB,,,

## 2022-01-27 RX ORDER — ASPIRIN/CALCIUM CARB/MAGNESIUM 325 MG
4 TABLET ORAL
Qty: 108 LOZENGE | Refills: 0 | Status: SHIPPED | OUTPATIENT
Start: 2022-01-27 | End: 2022-05-06

## 2022-01-27 RX ORDER — ASPIRIN/CALCIUM CARB/MAGNESIUM 325 MG
4 TABLET ORAL
Qty: 144 LOZENGE | Refills: 0 | Status: SHIPPED | OUTPATIENT
Start: 2022-01-27 | End: 2022-01-27 | Stop reason: RX

## 2022-02-03 ENCOUNTER — LAB VISIT (OUTPATIENT)
Dept: LAB | Facility: HOSPITAL | Age: 52
End: 2022-02-03
Attending: NURSE PRACTITIONER
Payer: MEDICAID

## 2022-02-03 ENCOUNTER — CLINICAL SUPPORT (OUTPATIENT)
Dept: SMOKING CESSATION | Facility: CLINIC | Age: 52
End: 2022-02-03
Payer: COMMERCIAL

## 2022-02-03 DIAGNOSIS — E11.65 TYPE 2 DIABETES MELLITUS WITH HYPERGLYCEMIA, WITH LONG-TERM CURRENT USE OF INSULIN: ICD-10-CM

## 2022-02-03 DIAGNOSIS — E03.9 ACQUIRED HYPOTHYROIDISM: ICD-10-CM

## 2022-02-03 DIAGNOSIS — F17.200 NICOTINE DEPENDENCE: Primary | ICD-10-CM

## 2022-02-03 DIAGNOSIS — Z79.4 TYPE 2 DIABETES MELLITUS WITH HYPERGLYCEMIA, WITH LONG-TERM CURRENT USE OF INSULIN: ICD-10-CM

## 2022-02-03 DIAGNOSIS — N18.30 CKD STAGE 3 DUE TO TYPE 2 DIABETES MELLITUS: ICD-10-CM

## 2022-02-03 DIAGNOSIS — I10 ESSENTIAL HYPERTENSION: ICD-10-CM

## 2022-02-03 DIAGNOSIS — E11.22 CKD STAGE 3 DUE TO TYPE 2 DIABETES MELLITUS: ICD-10-CM

## 2022-02-03 LAB
ALBUMIN SERPL BCP-MCNC: 3.2 G/DL (ref 3.5–5.2)
ALBUMIN/CREAT UR: 69.6 UG/MG (ref 0–30)
ALP SERPL-CCNC: 30 U/L (ref 55–135)
ALT SERPL W/O P-5'-P-CCNC: 19 U/L (ref 10–44)
ANION GAP SERPL CALC-SCNC: 8 MMOL/L (ref 8–16)
AST SERPL-CCNC: 17 U/L (ref 10–40)
BILIRUB SERPL-MCNC: 0.3 MG/DL (ref 0.1–1)
BUN SERPL-MCNC: 16 MG/DL (ref 6–20)
CALCIUM SERPL-MCNC: 9.6 MG/DL (ref 8.7–10.5)
CHLORIDE SERPL-SCNC: 107 MMOL/L (ref 95–110)
CO2 SERPL-SCNC: 26 MMOL/L (ref 23–29)
CREAT SERPL-MCNC: 1.1 MG/DL (ref 0.5–1.4)
CREAT UR-MCNC: 126.5 MG/DL (ref 15–325)
EST. GFR  (AFRICAN AMERICAN): >60 ML/MIN/1.73 M^2
EST. GFR  (NON AFRICAN AMERICAN): 59 ML/MIN/1.73 M^2
ESTIMATED AVG GLUCOSE: 237 MG/DL (ref 68–131)
GLUCOSE SERPL-MCNC: 100 MG/DL (ref 70–110)
HBA1C MFR BLD: 9.9 % (ref 4–5.6)
MAGNESIUM SERPL-MCNC: 1.4 MG/DL (ref 1.6–2.6)
MICROALBUMIN UR DL<=1MG/L-MCNC: 88 UG/ML
POTASSIUM SERPL-SCNC: 4 MMOL/L (ref 3.5–5.1)
PROT SERPL-MCNC: 6.5 G/DL (ref 6–8.4)
SODIUM SERPL-SCNC: 141 MMOL/L (ref 136–145)
TSH SERPL DL<=0.005 MIU/L-ACNC: 2.21 UIU/ML (ref 0.4–4)

## 2022-02-03 PROCEDURE — 82570 ASSAY OF URINE CREATININE: CPT | Performed by: NURSE PRACTITIONER

## 2022-02-03 PROCEDURE — 99999 PR PBB SHADOW E&M-EST. PATIENT-LVL II: CPT | Mod: PBBFAC,,,

## 2022-02-03 PROCEDURE — 80053 COMPREHEN METABOLIC PANEL: CPT | Performed by: NURSE PRACTITIONER

## 2022-02-03 PROCEDURE — 84443 ASSAY THYROID STIM HORMONE: CPT | Performed by: NURSE PRACTITIONER

## 2022-02-03 PROCEDURE — 36415 COLL VENOUS BLD VENIPUNCTURE: CPT | Performed by: NURSE PRACTITIONER

## 2022-02-03 PROCEDURE — 83735 ASSAY OF MAGNESIUM: CPT | Performed by: NURSE PRACTITIONER

## 2022-02-03 PROCEDURE — 99999 PR PBB SHADOW E&M-EST. PATIENT-LVL II: ICD-10-PCS | Mod: PBBFAC,,,

## 2022-02-03 PROCEDURE — 99404 PR PREVENT COUNSEL,INDIV,60 MIN: ICD-10-PCS | Mod: S$GLB,,,

## 2022-02-03 PROCEDURE — 83036 HEMOGLOBIN GLYCOSYLATED A1C: CPT | Performed by: NURSE PRACTITIONER

## 2022-02-03 PROCEDURE — 99404 PREV MED CNSL INDIV APPRX 60: CPT | Mod: S$GLB,,,

## 2022-02-03 RX ORDER — VARENICLINE TARTRATE 1 MG/1
1 TABLET, FILM COATED ORAL 2 TIMES DAILY
Qty: 60 TABLET | Refills: 0 | Status: SHIPPED | OUTPATIENT
Start: 2022-02-03 | End: 2022-04-04 | Stop reason: SDUPTHER

## 2022-02-03 NOTE — PROGRESS NOTES
Individual Follow-Up Form    2/3/2022    Quit Date: TBD    Clinical Status of Patient: Outpatient    Length of Service: 60 minutes    Continuing Medication: yes  Nicotine Lozenges and Varenecline (new order)    Other Medications: Lexapro and Vraylar (no interactions found)     Target Symptoms: Withdrawal and medication side effects. The following were  rated moderate (3) to severe (4) on TCRS:  · Moderate (3): none  · Severe (4): none    Comments: Patient seen in clinic today regarding smoking cessation progress update. Pt reports that she is smoking 15 cpd. New goal set at 13 cpd by patient. Pt will start on Varenecline 1 mg BID and 4 mg nicotine lozenges PRN (1-2 per hour in place of cigarettes.) Pt unable to use patches due to adhesive causing irritation on skin. Pt denies any low moods or abnormal changes in behavior at this time. Started pt on rate reduction and wait times prior to smoking. Pt encouraged to pick a quit day. Identified patient's personal reasons for wanting to quit as 1) Her health and 2) Her family. Reviewed learned addiction model, trigger, cues, and short/long term consequences of tobacco use. Pt identifies her personal triggers as withdrawal upon waking, going out, following meals, family stress, and boredom. Pt's exhaled carbon monoxide level was 12 ppm as per Smokerlyzer. (non- smoker = 0-5 ppm.) Her last cigarette was appox. 30 minutes ago. Pt to continue with counseling in 1 week.    Diagnosis: F17.200    Next Visit: 1 week

## 2022-02-09 ENCOUNTER — CLINICAL SUPPORT (OUTPATIENT)
Dept: SMOKING CESSATION | Facility: CLINIC | Age: 52
End: 2022-02-09
Payer: COMMERCIAL

## 2022-02-09 DIAGNOSIS — F17.200 NICOTINE DEPENDENCE: Primary | ICD-10-CM

## 2022-02-09 PROCEDURE — 99404 PREV MED CNSL INDIV APPRX 60: CPT | Mod: S$GLB,,,

## 2022-02-09 PROCEDURE — 99999 PR PBB SHADOW E&M-EST. PATIENT-LVL I: CPT | Mod: PBBFAC,,,

## 2022-02-09 PROCEDURE — 99999 PR PBB SHADOW E&M-EST. PATIENT-LVL I: ICD-10-PCS | Mod: PBBFAC,,,

## 2022-02-09 PROCEDURE — 99404 PR PREVENT COUNSEL,INDIV,60 MIN: ICD-10-PCS | Mod: S$GLB,,,

## 2022-02-09 NOTE — PROGRESS NOTES
Individual Follow-Up Form    2/9/2022    Quit Date: TBD    Clinical Status of Patient: Outpatient    Length of Service: 60 minutes    Continuing Medication: yes  Chantix    Other Medications: none     Target Symptoms: Withdrawal and medication side effects. The following were  rated moderate (3) to severe (4) on TCRS:  · Moderate (3): none  · Severe (4): none    Comments: Patient seen in clinic today regarding smoking cessation progress update. Pt reports that she is smoking 15 cpd. Goal reinforced  at 13 cpd. Pt highly encouraged to only keep daily amount allowed in pack to hold herself accountable. Pt started on Varenecline 1 mg BID (currently on week 1starter instructions) and 4 mg nicotine lozenges PRN (1-2 per hour in place of cigarettes.)  She hasn't been using lozenges as much because she prefers sugar free jolly rancher candies. Pt denies any low moods or abnormal changes in behavior at this time. Pt encouraged to implement rate reduction and wait times prior to smoking. Completion of TCRS (Tobacco Cessation Rating Scale) reviewed strategies, cues, and triggers. Introduced the negative impact of tobacco on health, the health advantages of discontinuing the use of tobacco, time line improved health changes after a quit, withdrawal issues to expect from nicotine and habit, and ways to achieve the goal of a quit.Pt's exhaled carbon monoxide level was 8 ppm as per Smokerlyzer. (non- smoker = 0-5 ppm.) Her last cigarette was appox. 1 hour ago. Pt to continue with counseling in 1 week.    Diagnosis: F17.200    Next Visit: 1 week

## 2022-02-16 ENCOUNTER — TELEPHONE (OUTPATIENT)
Dept: SMOKING CESSATION | Facility: CLINIC | Age: 52
End: 2022-02-16
Payer: MEDICAID

## 2022-02-16 NOTE — TELEPHONE ENCOUNTER
Contacted patient in order to reschedule cancelled smoking cessation follow up appointment. Patient has opted to reschedule for 2/23/22 at 2pm.

## 2022-02-23 ENCOUNTER — CLINICAL SUPPORT (OUTPATIENT)
Dept: SMOKING CESSATION | Facility: CLINIC | Age: 52
End: 2022-02-23
Payer: COMMERCIAL

## 2022-02-23 DIAGNOSIS — F17.200 NICOTINE DEPENDENCE: Primary | ICD-10-CM

## 2022-02-23 PROCEDURE — 99999 PR PBB SHADOW E&M-EST. PATIENT-LVL I: ICD-10-PCS | Mod: PBBFAC,,,

## 2022-02-23 PROCEDURE — 99404 PREV MED CNSL INDIV APPRX 60: CPT | Mod: S$GLB,,,

## 2022-02-23 PROCEDURE — 99999 PR PBB SHADOW E&M-EST. PATIENT-LVL I: CPT | Mod: PBBFAC,,,

## 2022-02-23 PROCEDURE — 99404 PR PREVENT COUNSEL,INDIV,60 MIN: ICD-10-PCS | Mod: S$GLB,,,

## 2022-02-23 NOTE — PROGRESS NOTES
Individual Follow-Up Form    2/23/2022    Quit Date: TBD    Clinical Status of Patient: Outpatient    Length of Service: 60 minutes    Continuing Medication: yes  Nicotine Lozenges (will discontinue) and Varenicline    Other Medications: none     Target Symptoms: Withdrawal and medication side effects. The following were  rated moderate (3) to severe (4) on TCRS:  · Moderate (3): none  · Severe (4): none    Comments: Patient seen in clinic today regarding smoking cessation progress update. Pt reports that she is smoking 13 cpd. New goal set <11 cpd. Pt continues taking Varenecline 1 mg BID with GURPREET noted. 4 mg nicotine lozenges D/C due to patient not liking. Pt experiencing mild headaches, moodiness, and sadness. I have advised pt to report if these continue to worsen. Pt  doing well with rate reduction and wait times prior to smoking. Completion of TCRS (Tobacco Cessation Rating Scale) reviewed strategies, controlling environment, cues, triggers, new goals set. Introduced high risk situations with preparation interventions, caffeine similarities with withdrawal issues of habit and nicotine, alcohol, understanding urges, cravings, stress and relaxation. Open discussion with intervention discussion.  Pt's exhaled carbon monoxide level was 12 ppm as per Smokerlyzer. (non- smoker = 0-5 ppm.)  Pt to continue with counseling in 1 week.    Diagnosis: F17.200    Next Visit: 1 week

## 2022-03-02 ENCOUNTER — TELEPHONE (OUTPATIENT)
Dept: SMOKING CESSATION | Facility: CLINIC | Age: 52
End: 2022-03-02
Payer: MEDICAID

## 2022-03-02 NOTE — TELEPHONE ENCOUNTER
Contacted patient regarding smoking cessation follow up appointment. Patient is unable to come into the clinic today and requests to reschedule for tomorrow, 3/3/22 at 3pm.

## 2022-03-03 ENCOUNTER — CLINICAL SUPPORT (OUTPATIENT)
Dept: SMOKING CESSATION | Facility: CLINIC | Age: 52
End: 2022-03-03
Payer: COMMERCIAL

## 2022-03-03 DIAGNOSIS — F17.200 NICOTINE DEPENDENCE: Primary | ICD-10-CM

## 2022-03-03 PROCEDURE — 99999 PR PBB SHADOW E&M-EST. PATIENT-LVL I: ICD-10-PCS | Mod: PBBFAC,,,

## 2022-03-03 PROCEDURE — 99999 PR PBB SHADOW E&M-EST. PATIENT-LVL I: CPT | Mod: PBBFAC,,,

## 2022-03-03 PROCEDURE — 99402 PREV MED CNSL INDIV APPRX 30: CPT | Mod: S$GLB,,,

## 2022-03-03 PROCEDURE — 99402 PR PREVENT COUNSEL,INDIV,30 MIN: ICD-10-PCS | Mod: S$GLB,,,

## 2022-03-03 NOTE — PROGRESS NOTES
Individual Follow-Up Form    3/3/2022    Quit Date: TBD    Clinical Status of Patient: Outpatient    Length of Service: 30 minutes    Continuing Medication: no    Other Medications: none     Target Symptoms: Withdrawal and medication side effects. The following were  rated moderate (3) to severe (4) on TCRS:  · Moderate (3): none  · Severe (4): none    Comments: Patient seen in clinic today regarding smoking cessation follow up. Patient states that she is back up to smoking 1 ppd. She has stopped using Varenicline stating that she does not feel like right now is a good time to try to quit smoking. She is currently going through medication adjustments/changes with her physician and she is also being treated for a gambling addiction. Patient agrees to pause her smoking cessation attempt at this time in order to feel less overwhelmed. I have provided support and encouragement to pt and asked her to reach out to me if needed before our next counseling session, which is in 4 weeks.    Diagnosis: F17.200    Next Visit:  4 weeks

## 2022-03-31 ENCOUNTER — CLINICAL SUPPORT (OUTPATIENT)
Dept: SMOKING CESSATION | Facility: CLINIC | Age: 52
End: 2022-03-31
Payer: COMMERCIAL

## 2022-03-31 DIAGNOSIS — F17.200 NICOTINE DEPENDENCE: Primary | ICD-10-CM

## 2022-03-31 PROCEDURE — 99403 PR PREVENT COUNSEL,INDIV,45 MIN: ICD-10-PCS | Mod: S$GLB,,,

## 2022-03-31 PROCEDURE — 99999 PR PBB SHADOW E&M-EST. PATIENT-LVL I: CPT | Mod: PBBFAC,,,

## 2022-03-31 PROCEDURE — 99403 PREV MED CNSL INDIV APPRX 45: CPT | Mod: S$GLB,,,

## 2022-03-31 PROCEDURE — 99999 PR PBB SHADOW E&M-EST. PATIENT-LVL I: ICD-10-PCS | Mod: PBBFAC,,,

## 2022-03-31 NOTE — PROGRESS NOTES
"Individual Follow-Up Form    3/31/2022    Quit Date: TBD    Clinical Status of Patient: Outpatient    Continuing Medication: yes  Chantix (Varenicline)    Other Medications: none     Target Symptoms: Withdrawal and medication side effects. The following were  rated moderate (3) to severe (4) on TCRS:  · Moderate (3): none  · Severe (4): none    Comments: Patient seen in clinic today regarding smoking cessation progress update. She recently took a break from her journey and has decided to start again. Pt reports that she is smoking 1ppd. New goal set at <16cpd. Pt has started taking Varenecline 1 mg BID again with GURPREET noted. Pt started on rate reduction and wait times prior to smoking. Reviewed strategies, controlling environment, cues, triggers, new goals set. Patient encouraged to take "one day at a time" approach to her cessation journey.  Pt's exhaled carbon monoxide level was 13 ppm as per Smokerlyzer. (non- smoker = 0-5 ppm.)  Pt to continue with counseling in 1 week.    Diagnosis: F17.200    Next Visit: 1 week    "

## 2022-04-02 DIAGNOSIS — F17.200 NICOTINE DEPENDENCE: ICD-10-CM

## 2022-04-04 DIAGNOSIS — F17.200 NICOTINE DEPENDENCE: ICD-10-CM

## 2022-04-04 RX ORDER — VARENICLINE TARTRATE 1 MG/1
1 TABLET, FILM COATED ORAL 2 TIMES DAILY
Qty: 56 TABLET | Refills: 0 | Status: SHIPPED | OUTPATIENT
Start: 2022-04-04 | End: 2022-04-28 | Stop reason: SDUPTHER

## 2022-04-04 RX ORDER — VARENICLINE TARTRATE 1 MG/1
1 TABLET, FILM COATED ORAL 2 TIMES DAILY
Qty: 56 TABLET | Refills: 0 | Status: SHIPPED | OUTPATIENT
Start: 2022-04-04 | End: 2022-04-04 | Stop reason: SDUPTHER

## 2022-04-07 ENCOUNTER — CLINICAL SUPPORT (OUTPATIENT)
Dept: SMOKING CESSATION | Facility: CLINIC | Age: 52
End: 2022-04-07
Payer: COMMERCIAL

## 2022-04-07 DIAGNOSIS — F17.200 NICOTINE DEPENDENCE: Primary | ICD-10-CM

## 2022-04-07 PROCEDURE — 99403 PREV MED CNSL INDIV APPRX 45: CPT | Mod: S$GLB,,,

## 2022-04-07 PROCEDURE — 99403 PR PREVENT COUNSEL,INDIV,45 MIN: ICD-10-PCS | Mod: S$GLB,,,

## 2022-04-07 PROCEDURE — 99999 PR PBB SHADOW E&M-EST. PATIENT-LVL II: ICD-10-PCS | Mod: PBBFAC,,,

## 2022-04-07 PROCEDURE — 99999 PR PBB SHADOW E&M-EST. PATIENT-LVL II: CPT | Mod: PBBFAC,,,

## 2022-04-07 NOTE — PROGRESS NOTES
Individual Follow-Up Form    4/7/2022    Quit Date: TBD    Clinical Status of Patient: Outpatient    Length of Service: 45 minutes    Continuing Medication: yes  Chantix (Varenicline)    Other Medications: none     Target Symptoms: Withdrawal and medication side effects. The following were  rated moderate (3) to severe (4) on TCRS:  · Moderate (3): none  · Severe (4): none    Comments: Patient seen in clinic today regarding smoking cessation progress update. Pt reports that she is smoking 15-18cpd (down from 1ppd.) New goal range set at 13-16cpd. Pt remains on Varenecline 1 mg BID again with GURPREET noted. Pt denies any low moods or abnormal changes in behaviors at this time. Pt doing well with rate reduction and wait times prior to smoking. Reviewed strategies, controlling environment, cues, triggers, new goals set. Patient encouraged to remain positive and consistent.  Pt's exhaled carbon monoxide level was 11 ppm as per Smokerlyzer. (non- smoker = 0-5 ppm.)  Pt to continue with counseling in 1 week.    Diagnosis: F17.200    Next Visit: 1 week

## 2022-04-13 ENCOUNTER — CLINICAL SUPPORT (OUTPATIENT)
Dept: SMOKING CESSATION | Facility: CLINIC | Age: 52
End: 2022-04-13
Payer: COMMERCIAL

## 2022-04-13 DIAGNOSIS — F17.200 NICOTINE DEPENDENCE: Primary | ICD-10-CM

## 2022-04-13 PROCEDURE — 99999 PR PBB SHADOW E&M-EST. PATIENT-LVL II: CPT | Mod: PBBFAC,,,

## 2022-04-13 PROCEDURE — 99999 PR PBB SHADOW E&M-EST. PATIENT-LVL II: ICD-10-PCS | Mod: PBBFAC,,,

## 2022-04-13 PROCEDURE — 99403 PR PREVENT COUNSEL,INDIV,45 MIN: ICD-10-PCS | Mod: S$GLB,,,

## 2022-04-13 PROCEDURE — 99403 PREV MED CNSL INDIV APPRX 45: CPT | Mod: S$GLB,,,

## 2022-04-13 NOTE — PROGRESS NOTES
Individual Follow-Up Form    4/13/2022    Quit Date: TBD    Clinical Status of Patient: Outpatient    Length of Service: 45 minutes    Continuing Medication: yes  Chantix (Varenicline)    Other Medications: none     Target Symptoms: Withdrawal and medication side effects. The following were  rated moderate (3) to severe (4) on TCRS:  · Moderate (3): none  · Severe (4): none    Comments: Patient seen in clinic today regarding smoking cessation progress update. Pt reports that she was able to meet goal of 15-18 cpd (down from 1ppd.) New goal range set at 11-14 cpd. Pt remains on Varenecline 1 mg BID again with GURPREET noted. Pt denies any low moods or abnormal changes in behaviors at this time. Pt doing well with rate reduction and wait times prior to smoking. Discussion focused on daily rituals. Reviewed strategies, controlling environment, cues, stress, anxiety, triggers, diet, exercise, and new goals set. Pt's exhaled carbon monoxide level was 10 ppm as per Smokerlyzer. (non- smoker = 0-5 ppm.)  Pt to continue with counseling in 1 week.    Diagnosis: F17.200    Next Visit: 1 week

## 2022-04-21 ENCOUNTER — CLINICAL SUPPORT (OUTPATIENT)
Dept: SMOKING CESSATION | Facility: CLINIC | Age: 52
End: 2022-04-21
Payer: COMMERCIAL

## 2022-04-21 DIAGNOSIS — F17.200 NICOTINE DEPENDENCE: Primary | ICD-10-CM

## 2022-04-21 PROCEDURE — 99402 PR PREVENT COUNSEL,INDIV,30 MIN: ICD-10-PCS | Mod: S$GLB,,,

## 2022-04-21 PROCEDURE — 99402 PREV MED CNSL INDIV APPRX 30: CPT | Mod: S$GLB,,,

## 2022-04-21 PROCEDURE — 99999 PR PBB SHADOW E&M-EST. PATIENT-LVL I: CPT | Mod: PBBFAC,,,

## 2022-04-21 PROCEDURE — 99999 PR PBB SHADOW E&M-EST. PATIENT-LVL I: ICD-10-PCS | Mod: PBBFAC,,,

## 2022-04-21 NOTE — PROGRESS NOTES
"Individual Follow-Up Form    4/21/2022    Quit Date: TBD    Clinical Status of Patient: Outpatient    Continuing Medication: yes  Chantix(Varenicline)    Other Medications: none     Target Symptoms: Withdrawal and medication side effects. The following were  rated moderate (3) to severe (4) on TCRS:  · Moderate (3): none  · Severe (4): none    Comments: Spoke with patient regarding smoking cessation progress. Pt reports that she is smoking approximately 15 cpd. She is discouraged and feels that "nothing is working." Discussed the importance of mind-set and effort. Encouraged the patient to see the good in small accomplishments and taking a "one day at a time" approach. For example, she has been smoking less in her car, and I commended her for that accomplishment. Pt remains on prescribed 1 mg Varenicline BID with not adverse reactions noted at this time. Patient denies any low moods or abnormal changes in behaviors at this time. Reviewed strategies, controlling environment, cues, stress, anxiety, triggers, diet, and new goals set. Patient is aiming to meet a daily rate goal of 12 cpd. Pt scheduled to continue with smoking cessation counseling in 1 week.    Diagnosis: F17.200    Next Visit: 1 week    "

## 2022-04-28 ENCOUNTER — CLINICAL SUPPORT (OUTPATIENT)
Dept: SMOKING CESSATION | Facility: CLINIC | Age: 52
End: 2022-04-28
Payer: COMMERCIAL

## 2022-04-28 DIAGNOSIS — F17.200 NICOTINE DEPENDENCE: Primary | ICD-10-CM

## 2022-04-28 PROCEDURE — 90853 GROUP PSYCHOTHERAPY: CPT | Mod: S$GLB,,,

## 2022-04-28 PROCEDURE — 99999 PR PBB SHADOW E&M-EST. PATIENT-LVL II: ICD-10-PCS | Mod: PBBFAC,,,

## 2022-04-28 PROCEDURE — 90853 PR GROUP PSYCHOTHERAPY: ICD-10-PCS | Mod: S$GLB,,,

## 2022-04-28 PROCEDURE — 99999 PR PBB SHADOW E&M-EST. PATIENT-LVL II: CPT | Mod: PBBFAC,,,

## 2022-04-28 RX ORDER — VARENICLINE TARTRATE 1 MG/1
1 TABLET, FILM COATED ORAL 2 TIMES DAILY
Qty: 56 TABLET | Refills: 0 | Status: SHIPPED | OUTPATIENT
Start: 2022-04-28 | End: 2022-05-30

## 2022-04-28 NOTE — PROGRESS NOTES
Individual Follow-Up Form    4/28/2022    Quit Date: TBD    Clinical Status of Patient: Outpatient    Continuing Medication: yes  Chantix (Varenicline)    Other Medications: none     Target Symptoms: Withdrawal and medication side effects. The following were  rated moderate (3) to severe (4) on TCRS:  · Moderate (3): none  · Severe (4): none    Comments: Spoke with patient regarding smoking cessation progress. Pt reports that she is smoking approximately 12-14 cpd. Goal remains <12 cpd. She continues to feel discouraged. I have pointed out that she decreased her daily rate from last week's meeting, and praised her for progress made thus far. Encouraged patient to continue trying and to remain patient. Pt remains on prescribed 1 mg Varenicline BID with no adverse reactions noted at this time. Patient denies any low moods or abnormal changes in behaviors at this time. Reviewed strategies, controlling environment, increasing wait times, stress, anxiety, triggers, diet, and new goals set. Pt scheduled to continue with smoking cessation counseling in 1 week.    Diagnosis: F17.200    Next Visit: 1 week

## 2022-05-01 ENCOUNTER — PATIENT MESSAGE (OUTPATIENT)
Dept: OTHER | Facility: OTHER | Age: 52
End: 2022-05-01
Payer: MEDICAID

## 2022-05-02 ENCOUNTER — PATIENT MESSAGE (OUTPATIENT)
Dept: OTHER | Facility: OTHER | Age: 52
End: 2022-05-02
Payer: MEDICAID

## 2022-05-05 ENCOUNTER — CLINICAL SUPPORT (OUTPATIENT)
Dept: SMOKING CESSATION | Facility: CLINIC | Age: 52
End: 2022-05-05
Payer: COMMERCIAL

## 2022-05-05 DIAGNOSIS — F17.200 NICOTINE DEPENDENCE: Primary | ICD-10-CM

## 2022-05-05 PROCEDURE — 99999 PR PBB SHADOW E&M-EST. PATIENT-LVL I: ICD-10-PCS | Mod: PBBFAC,,,

## 2022-05-05 PROCEDURE — 99999 PR PBB SHADOW E&M-EST. PATIENT-LVL I: CPT | Mod: PBBFAC,,,

## 2022-05-05 PROCEDURE — 90853 PR GROUP PSYCHOTHERAPY: ICD-10-PCS | Mod: S$GLB,,,

## 2022-05-05 PROCEDURE — 90853 GROUP PSYCHOTHERAPY: CPT | Mod: S$GLB,,,

## 2022-05-05 NOTE — PROGRESS NOTES
Individual Follow-Up Form    5/5/2022    Quit Date: TBD    Clinical Status of Patient: Outpatient     Continuing Medication: yes  Chantix    Other Medications: none     Target Symptoms: Withdrawal and medication side effects. The following were  rated moderate (3) to severe (4) on TCRS:  · Moderate (3): none  · Severe (4): none    Comments: Patient seen in clinic today regarding smoking cessation progress update. Pt remains at daily rate of 12 cpd (down from 1ppd.) She declined to set a new daily rate goal. Recommended to attempt to remain under 12 and encouraged to attempt to go an extended period period of time without smoking. Pt remains on Varenecline 1 mg BID again with GURPREET noted. Pt denies any low moods or abnormal changes in behaviors at this time. Pt doing well with rate reduction and wait times prior to smoking. Reviewed strategies, controlling environment, cues, stress, anxiety, triggers, diet, and new goals set. New goals include smoking only allowed on odd numbers of the clock, no more smoking in bed, and set/complete small goals such as drink a glass of water before eating. Pt's exhaled carbon monoxide level was 12 ppm as per Smokerlyzer. (non- smoker = 0-5 ppm.)  Pt to continue with counseling in 1 week.    Diagnosis: F17.200    Next Visit: 1 week

## 2022-05-09 RX ORDER — VARENICLINE TARTRATE 1 MG/1
TABLET, FILM COATED ORAL
Qty: 60 TABLET | Refills: 0 | OUTPATIENT
Start: 2022-05-09

## 2022-05-23 ENCOUNTER — TELEPHONE (OUTPATIENT)
Dept: PHARMACY | Facility: CLINIC | Age: 52
End: 2022-05-23
Payer: MEDICAID

## 2022-05-23 NOTE — TELEPHONE ENCOUNTER
Doris DANG Staff Just now (2:20 PM)     CF    Hello,       The prior authorization for Hue Hernandez's Toubano Max prescription has been APPROVED FROM 05/23/2022 TO 05/23/2023 with copayment of $0.       [facility] [place] @ [phone] will reach out to patient for further correspondence.     If there are any additional questions or concerns, please contact me.      Sincerely,   Doris Haque   Prior Authorization Department   [facility]    Routing comment

## 2022-05-30 ENCOUNTER — TELEPHONE (OUTPATIENT)
Dept: SMOKING CESSATION | Facility: CLINIC | Age: 52
End: 2022-05-30
Payer: MEDICAID

## 2022-05-30 NOTE — TELEPHONE ENCOUNTER
Successful contact with patient regarding smoking cessation follow up. Patient states that she does not wish to renew benefits at this time. Encouraged pt to reach out when she feels that she is ready to attempt another quit. Contact information provided.

## 2022-06-14 ENCOUNTER — PATIENT MESSAGE (OUTPATIENT)
Dept: OTHER | Facility: OTHER | Age: 52
End: 2022-06-14
Payer: MEDICAID

## 2022-06-30 ENCOUNTER — CLINICAL SUPPORT (OUTPATIENT)
Dept: SMOKING CESSATION | Facility: CLINIC | Age: 52
End: 2022-06-30
Payer: COMMERCIAL

## 2022-06-30 DIAGNOSIS — F17.200 NICOTINE DEPENDENCE: Primary | ICD-10-CM

## 2022-06-30 PROCEDURE — 99407 PR TOBACCO USE CESSATION INTENSIVE >10 MINUTES: ICD-10-PCS | Mod: S$GLB,,,

## 2022-06-30 PROCEDURE — 99999 PR PBB SHADOW E&M-EST. PATIENT-LVL I: CPT | Mod: PBBFAC,,,

## 2022-06-30 PROCEDURE — 99407 BEHAV CHNG SMOKING > 10 MIN: CPT | Mod: S$GLB,,,

## 2022-06-30 PROCEDURE — 99999 PR PBB SHADOW E&M-EST. PATIENT-LVL I: ICD-10-PCS | Mod: PBBFAC,,,

## 2022-06-30 NOTE — PROGRESS NOTES
Called pt to f/u on her 3 and 6 month smoking cessation quit status. Pt stated she is still smoking. Informed her she has benefits available and is able to rejoin. Pt not ready to make appointment. She will call back when ready. Informed her of benefit period, phone follow ups, and contact information. Will complete smart form and will continue to follow up on quit #2 episode.

## 2022-08-04 ENCOUNTER — PATIENT MESSAGE (OUTPATIENT)
Dept: OTHER | Facility: OTHER | Age: 52
End: 2022-08-04
Payer: MEDICAID

## 2022-09-27 ENCOUNTER — PATIENT OUTREACH (OUTPATIENT)
Dept: ADMINISTRATIVE | Facility: HOSPITAL | Age: 52
End: 2022-09-27
Payer: MEDICAID

## 2022-10-03 PROBLEM — G47.33 OSA ON CPAP: Status: ACTIVE | Noted: 2022-10-03

## 2022-10-17 ENCOUNTER — PATIENT OUTREACH (OUTPATIENT)
Dept: ADMINISTRATIVE | Facility: HOSPITAL | Age: 52
End: 2022-10-17
Payer: MEDICAID

## 2022-10-17 NOTE — PROGRESS NOTES
Contacted pt in reference to overdue cervical screening. Pt agreed to an appt. Appt letter mailed.

## 2022-10-30 ENCOUNTER — PATIENT MESSAGE (OUTPATIENT)
Dept: OTHER | Facility: OTHER | Age: 52
End: 2022-10-30
Payer: MEDICAID

## 2022-12-19 ENCOUNTER — IMMUNIZATION (OUTPATIENT)
Dept: FAMILY MEDICINE | Facility: CLINIC | Age: 52
End: 2022-12-19
Payer: MEDICAID

## 2022-12-19 DIAGNOSIS — Z23 NEED FOR VACCINATION: Primary | ICD-10-CM

## 2022-12-19 PROCEDURE — 91312 COVID-19, MRNA, LNP-S, BIVALENT BOOSTER, PF, 30 MCG/0.3 ML DOSE: CPT | Mod: PBBFAC

## 2022-12-19 PROCEDURE — 0124A COVID-19, MRNA, LNP-S, BIVALENT BOOSTER, PF, 30 MCG/0.3 ML DOSE: CPT | Mod: PBBFAC

## 2023-01-07 ENCOUNTER — PATIENT MESSAGE (OUTPATIENT)
Dept: ADMINISTRATIVE | Facility: OTHER | Age: 53
End: 2023-01-07
Payer: MEDICAID

## 2023-01-30 ENCOUNTER — TELEPHONE (OUTPATIENT)
Dept: SMOKING CESSATION | Facility: CLINIC | Age: 53
End: 2023-01-30
Payer: MEDICAID

## 2023-02-05 ENCOUNTER — PATIENT MESSAGE (OUTPATIENT)
Dept: ADMINISTRATIVE | Facility: OTHER | Age: 53
End: 2023-02-05
Payer: MEDICAID

## 2023-02-07 PROBLEM — Q20.8 ABNORMALITY OF LEFT VENTRICULAR OUTFLOW TRACT: Status: ACTIVE | Noted: 2023-02-07

## 2023-02-14 PROBLEM — J45.20 MILD INTERMITTENT ASTHMA WITHOUT COMPLICATION: Status: ACTIVE | Noted: 2023-02-14

## 2023-02-14 PROBLEM — J45.20 MILD INTERMITTENT ASTHMA WITHOUT COMPLICATION: Status: RESOLVED | Noted: 2023-02-14 | Resolved: 2023-02-14

## 2023-06-06 ENCOUNTER — HOSPITAL ENCOUNTER (OUTPATIENT)
Dept: RADIOLOGY | Facility: HOSPITAL | Age: 53
Discharge: HOME | End: 2023-06-06
Payer: MEDICAID

## 2023-06-06 DIAGNOSIS — Z72.0 TOBACCO USE: ICD-10-CM

## 2023-06-06 DIAGNOSIS — I10 PRIMARY HYPERTENSION: ICD-10-CM

## 2023-06-06 PROCEDURE — 71271 CT CHEST LUNG SCREENING LOW DOSE: ICD-10-PCS | Mod: 26,,, | Performed by: RADIOLOGY

## 2023-06-06 PROCEDURE — 71271 CT THORAX LUNG CANCER SCR C-: CPT | Mod: 26,,, | Performed by: RADIOLOGY

## 2023-06-06 PROCEDURE — 71271 CT THORAX LUNG CANCER SCR C-: CPT | Mod: TC

## 2023-09-12 ENCOUNTER — PATIENT MESSAGE (OUTPATIENT)
Dept: ADMINISTRATIVE | Facility: OTHER | Age: 53
End: 2023-09-12
Payer: MEDICAID

## 2023-10-18 PROBLEM — E11.65 TYPE 2 DIABETES MELLITUS WITH HYPERGLYCEMIA, WITH LONG-TERM CURRENT USE OF INSULIN: Status: ACTIVE | Noted: 2023-10-18

## 2023-10-18 PROBLEM — E11.42 TYPE 2 DIABETES MELLITUS WITH PERIPHERAL NEUROPATHY: Status: ACTIVE | Noted: 2023-10-18

## 2023-10-18 PROBLEM — Z79.4 TYPE 2 DIABETES MELLITUS WITH HYPERGLYCEMIA, WITH LONG-TERM CURRENT USE OF INSULIN: Status: ACTIVE | Noted: 2023-10-18

## 2023-10-31 PROBLEM — G56.01 RIGHT CARPAL TUNNEL SYNDROME: Status: ACTIVE | Noted: 2023-10-31

## 2023-11-09 DIAGNOSIS — E11.8 TYPE II DIABETES MELLITUS WITH COMPLICATION: Primary | ICD-10-CM

## 2023-11-09 RX ORDER — BLOOD-GLUCOSE SENSOR
1 EACH MISCELLANEOUS
Qty: 3 EACH | Refills: 10 | Status: SHIPPED | OUTPATIENT
Start: 2023-11-09 | End: 2024-01-18 | Stop reason: SDUPTHER

## 2023-12-18 ENCOUNTER — PATIENT MESSAGE (OUTPATIENT)
Dept: OTHER | Facility: OTHER | Age: 53
End: 2023-12-18
Payer: MEDICAID

## 2024-01-29 ENCOUNTER — HOSPITAL ENCOUNTER (OUTPATIENT)
Dept: RADIOLOGY | Facility: HOSPITAL | Age: 54
Discharge: HOME | End: 2024-01-29
Attending: NURSE PRACTITIONER
Payer: MEDICAID

## 2024-01-29 DIAGNOSIS — R59.0 LOCALIZED ENLARGED LYMPH NODES: ICD-10-CM

## 2024-01-29 PROCEDURE — 71250 CT THORAX DX C-: CPT | Mod: TC

## 2024-01-29 PROCEDURE — 71250 CT THORAX DX C-: CPT | Mod: 26,,, | Performed by: RADIOLOGY

## 2024-03-04 ENCOUNTER — PATIENT MESSAGE (OUTPATIENT)
Dept: ADMINISTRATIVE | Facility: OTHER | Age: 54
End: 2024-03-04
Payer: MEDICAID

## 2024-04-02 PROBLEM — J45.40 MODERATE PERSISTENT ASTHMA WITHOUT COMPLICATION: Status: ACTIVE | Noted: 2024-04-02

## 2024-04-16 PROBLEM — E11.65 TYPE 2 DIABETES MELLITUS WITH HYPERGLYCEMIA, WITH LONG-TERM CURRENT USE OF INSULIN: Status: RESOLVED | Noted: 2023-10-18 | Resolved: 2024-04-16

## 2024-04-16 PROBLEM — Z79.4 TYPE 2 DIABETES MELLITUS WITH HYPERGLYCEMIA, WITH LONG-TERM CURRENT USE OF INSULIN: Status: RESOLVED | Noted: 2023-10-18 | Resolved: 2024-04-16

## 2024-04-16 PROBLEM — E66.811 CLASS 1 OBESITY DUE TO EXCESS CALORIES WITH SERIOUS COMORBIDITY AND BODY MASS INDEX (BMI) OF 34.0 TO 34.9 IN ADULT: Status: ACTIVE | Noted: 2024-04-16

## 2024-04-16 PROBLEM — G72.0 STATIN MYOPATHY: Status: ACTIVE | Noted: 2024-04-16

## 2024-04-16 PROBLEM — E66.09 CLASS 1 OBESITY DUE TO EXCESS CALORIES WITH SERIOUS COMORBIDITY AND BODY MASS INDEX (BMI) OF 34.0 TO 34.9 IN ADULT: Status: ACTIVE | Noted: 2024-04-16

## 2024-04-16 PROBLEM — E11.42 TYPE 2 DIABETES MELLITUS WITH PERIPHERAL NEUROPATHY: Status: RESOLVED | Noted: 2023-10-18 | Resolved: 2024-04-16

## 2024-04-16 PROBLEM — E11.29 MICROALBUMINURIA DUE TO TYPE 2 DIABETES MELLITUS: Chronic | Status: RESOLVED | Noted: 2018-01-15 | Resolved: 2024-04-16

## 2024-04-16 PROBLEM — Z71.89 CPAP USE COUNSELING: Status: ACTIVE | Noted: 2024-04-16

## 2024-04-16 PROBLEM — R80.9 MICROALBUMINURIA DUE TO TYPE 2 DIABETES MELLITUS: Chronic | Status: RESOLVED | Noted: 2018-01-15 | Resolved: 2024-04-16

## 2024-04-16 PROBLEM — Z91.89 MULTIPLE RISK FACTORS FOR CORONARY ARTERY DISEASE: Status: ACTIVE | Noted: 2024-04-16

## 2024-04-16 PROBLEM — I20.89 EQUIVALENT ANGINA: Status: ACTIVE | Noted: 2024-04-16

## 2024-04-16 PROBLEM — T46.6X5A STATIN MYOPATHY: Status: ACTIVE | Noted: 2024-04-16

## 2024-04-22 ENCOUNTER — LAB VISIT (OUTPATIENT)
Dept: LAB | Facility: HOSPITAL | Age: 54
End: 2024-04-22
Attending: NURSE PRACTITIONER
Payer: MEDICAID

## 2024-04-22 DIAGNOSIS — J45.40 MODERATE PERSISTENT ASTHMA WITHOUT COMPLICATION: ICD-10-CM

## 2024-04-22 LAB
BASOPHILS # BLD AUTO: 0.18 K/UL (ref 0–0.2)
BASOPHILS NFR BLD: 1.3 % (ref 0–1.9)
DIFFERENTIAL METHOD BLD: ABNORMAL
EOSINOPHIL # BLD AUTO: 0.6 K/UL (ref 0–0.5)
EOSINOPHIL NFR BLD: 4.2 % (ref 0–8)
ERYTHROCYTE [DISTWIDTH] IN BLOOD BY AUTOMATED COUNT: 21 % (ref 11.5–14.5)
HCT VFR BLD AUTO: 45.4 % (ref 37–48.5)
HGB BLD-MCNC: 14.4 G/DL (ref 12–16)
IMM GRANULOCYTES # BLD AUTO: 0.13 K/UL (ref 0–0.04)
IMM GRANULOCYTES NFR BLD AUTO: 0.9 % (ref 0–0.5)
LYMPHOCYTES # BLD AUTO: 5.4 K/UL (ref 1–4.8)
LYMPHOCYTES NFR BLD: 38.5 % (ref 18–48)
MCH RBC QN AUTO: 24.2 PG (ref 27–31)
MCHC RBC AUTO-ENTMCNC: 31.7 G/DL (ref 32–36)
MCV RBC AUTO: 76 FL (ref 82–98)
MONOCYTES # BLD AUTO: 0.9 K/UL (ref 0.3–1)
MONOCYTES NFR BLD: 6.6 % (ref 4–15)
NEUTROPHILS # BLD AUTO: 6.8 K/UL (ref 1.8–7.7)
NEUTROPHILS NFR BLD: 48.5 % (ref 38–73)
NRBC BLD-RTO: 0 /100 WBC
PLATELET # BLD AUTO: 340 K/UL (ref 150–450)
PMV BLD AUTO: 10.6 FL (ref 9.2–12.9)
RBC # BLD AUTO: 5.96 M/UL (ref 4–5.4)
WBC # BLD AUTO: 13.98 K/UL (ref 3.9–12.7)

## 2024-04-22 PROCEDURE — 85025 COMPLETE CBC W/AUTO DIFF WBC: CPT | Performed by: NURSE PRACTITIONER

## 2024-04-22 PROCEDURE — 36415 COLL VENOUS BLD VENIPUNCTURE: CPT | Performed by: NURSE PRACTITIONER

## 2024-07-18 PROBLEM — T46.6X5A STATIN MYOPATHY: Status: RESOLVED | Noted: 2024-04-16 | Resolved: 2024-07-18

## 2024-07-18 PROBLEM — Z96.41 INSULIN PUMP IN PLACE: Status: ACTIVE | Noted: 2024-07-18

## 2024-07-18 PROBLEM — G72.0 STATIN MYOPATHY: Status: RESOLVED | Noted: 2024-04-16 | Resolved: 2024-07-18

## 2024-07-30 ENCOUNTER — PATIENT MESSAGE (OUTPATIENT)
Dept: ADMINISTRATIVE | Facility: OTHER | Age: 54
End: 2024-07-30
Payer: MEDICAID

## 2024-08-01 ENCOUNTER — CLINICAL SUPPORT (OUTPATIENT)
Dept: REHABILITATION | Facility: HOSPITAL | Age: 54
End: 2024-08-01
Payer: MEDICAID

## 2024-08-01 DIAGNOSIS — M54.16 LUMBAR BACK PAIN WITH RADICULOPATHY AFFECTING LOWER EXTREMITY: Primary | ICD-10-CM

## 2024-08-01 PROCEDURE — 97110 THERAPEUTIC EXERCISES: CPT | Mod: PN

## 2024-08-01 PROCEDURE — 97161 PT EVAL LOW COMPLEX 20 MIN: CPT | Mod: PN

## 2024-08-01 NOTE — PLAN OF CARE
[facility]   Physical Therapy Initial Evaluation      Name: Hue Orellana [facility]  Clinic Number: [id number]    Therapy Diagnosis:   Encounter Diagnosis   Name Primary?    Lumbar back pain with radiculopathy affecting lower extremity Yes        Physician: Keke Saini,*    Physician Orders: PT Eval and Treat   Medical Diagnosis from Referral: E78.2 (ICD-10-CM) - Mixed hyperlipidemia M54.40 (ICD-10-CM) - Acute right-sided low back pain with sciatica, sciatica laterality unspecified E66.9,Z68.37 (ICD-10-CM) - Class 2 obesity with body mass index (BMI) of 37.0 to 37.9 in adult, unspecified obesity type, unspecified whether serious comorbidity present  Evaluation Date: 8/1/2024  Authorization Period Expiration: 6/17/2025  Plan of Care Expiration: 11/1/2024  Progress Note Due: VISIT 4  Date of Surgery: N/A  Visit # / Visits authorized: 1/ 1   FOTO: 1/ 3    Precautions: Standard and Diabetes     Time In: 1245  Time Out: 1325  Total Billable Time: 40 minutes    Subjective     Date of onset: at least 10 years ago     History of current condition - Hue reports: has pain in her back that goes down her right leg. She states standing, walking increases her pain. She is only able to tolerate about 2 min of standing or walking. She is unable to walk through a small store, uses motorized chair at [employer]. She also uses a wheelchair for longer distances in community. She has had 2 falls where it feels like her leg gives out. Also acknowledges she had DM neuropathy     Falls: 2 falls in the last year, while walking in her home.     Imaging: bone scan films: Narrative & Impression  EXAMINATION:  XR HIPS BILATERAL 2 VIEW INCL AP PELVIS     CLINICAL HISTORY:  Pain in right hip     COMPARISON:  Left hip 02/06/2019.     FINDINGS:  Marginal spurring bilateral hip joints particularly at the superolateral margin of the acetabulum bilaterally.  Heterotopic ossification lateral to left hip and small focus  "of calcification adjacent to the right greater trochanter now evident.  No acute bony abnormality appreciated either hip.  Bony pelvis appears grossly intact.     Impression:     Mild degenerative change bilateral hips.        Electronically signed by:Rowan Padilla MD  Date:                                            2022  Time:                                           14:19    Prior Therapy: none   Social History:  lives with their family, daughter helps her get in and out tub and with [employer]s   Occupation: unable   Current Level of Function: uses cane on occasion     Pain:  Current 6/10, worst 10/10, best 6/10   Location: right buttocks  and back     Description: Tight and Electric  Aggravating Factors: Standing and Walking  Easing Factors: lying down    Patients goals: "make it better, at least a 4/10"      Medical History:   Past Medical History:   Diagnosis Date    Anticoagulant long-term use     Arthritis     Back pain     Carpal tunnel syndrome     left    COPD (chronic obstructive pulmonary disease)     Cubital tunnel syndrome     left    Cubital tunnel syndrome on right     Depression     Diabetes mellitus     Diabetes mellitus, type 2     Diabetic peripheral neuropathy     Encounter for blood transfusion     GERD (gastroesophageal reflux disease)     Glaucoma suspect     Glaucoma suspect of both eyes     High cholesterol     Hip pain     History of appendectomy     Hyperlipemia     Hypertension     Muscle spasms of both lower extremities     Neck pain     Neuropathy     Obesity     KENTRELL on CPAP     Radiation thyroiditis     Sensory peripheral neuropathy     Stroke      tia 3-4 years ago - states has had 2 strokes - problems with decision making    Tardive dyskinesia 2020       Surgical History:   Hue Hernandez  has a past surgical history that includes Cholecystectomy; Appendectomy; Eye surgery; Carpal tunnel release (Right);  section (, ); Dilation and curettage of uterus " (1992); Tubal ligation (2002); Carpal tunnel release (Left, 10/18/2018); Ulnar tunnel release (Left, 10/18/2018); Colonoscopy (N/A, 1/19/2023); and Carpal tunnel release (Right, 10/31/2023).    Medications:   Hue has a current medication list which includes the following prescription(s): albuterol, albuterol, aspirin, atorvastatin, baqsimi, bd ultra-fine short pen needle, fasenra pen, fasenra pen, blood sugar diagnostic, blood-glucose meter, dexcom g7 sensor, dexcom g6 transmitter, bupropion, buspirone, vraylar, cranberry, cyanocobalamin, empagliflozin, ezetimibe, fluticasone propionate, trelegy ellipta, furosemide, hydrocodone-acetaminophen, hydroxyzine hcl, insulin aspart u-100, levothyroxine, liothyronine, magnesium (oxide/aa chelate), metformin, metoprolol succinate, mirtazapine, omnipod 5 g6 intro kit (gen 5), omnipod 5 g6 pods (gen 5), ondansetron, pantoprazole, pregabalin, ozempic, senna, and vitamin d, and the following Facility-Administered Medications: 0.9% nacl, atropine, and dobutamine.    Allergies:   Review of patient's allergies indicates:   Allergen Reactions    Latex, natural rubber Other (See Comments)     Latex condoms burning pain.      Tiotropium bromide Other (See Comments)     Oral irritation/thrush    Iodine      Blisters (mouth)^  Blisters (mouth)^    Adhesive tape-silicones Rash     Use paper tape  If necessary        Objective      Observation: labored sit to stand from lobby and antalgic gait noted     Posture:  elevated left PSIS, iliac crest, increase lordosis noted     Lumbar Range of Motion:    Degrees Pain   Flexion 75%   -        Extension 100%   Feels good         Left Side Bending Above knee **        Right Side Bending Above knee  -        Left rotation   75% -        Right Rotation   75% -             Lower Extremity Strength  Right LE  Left LE    Knee extension: 5/5 Knee extension: 5/5   Knee flexion: 5/5 Knee flexion: 5/5   Hip flexion: 4-/5 Hip flexion: 4-/5   Hip extension:  " 5/5 Hip extension: 5/5   Hip abduction: 5/5 Hip abduction: 5/5   Hip adduction: 5/5 Hip adduction 5/5   Ankle dorsiflexion: 5/5 Ankle dorsiflexion: 5/5   Ankle plantarflexion: 5/5 Ankle plantarflexion: 5/5         Special Tests:  -Repeated Flexion: increase pain reported   -Repeated Ext: pain relief   -Piriformis Test: -       Neuro Dynamic Testing:    Sciatic nerve:      SLR: R = 60 degrees, likely hamstring      L = same as above        Femoral Nerve:    Femoral nerve test: -      Joint Mobility: diminished PA glides from L3-S1     Palpation: tenderness noted along paraspinals of L4-L5, increase pain with SI press     Sensation: neuropathy present in upper extremity/lower extremity     Flexibility: hamstring left >right    Andrew's test: R = - ; L = -   Shahbaz test: R = - ; L = -    Intake Outcome Measure for FOTO Lumbar Survey    Therapist reviewed FOTO scores for Hue Hernandez on 8/1/2024.   FOTO report - see Media section or FOTO account episode details.    Intake Score: 72%         Treatment     Total Treatment time (time-based codes) separate from Evaluation: 15 minutes     Hue received the treatments listed below:      therapeutic exercises to develop strength, endurance, ROM, flexibility, posture, and core stabilization for 15 minutes including:  Single knee to chest 5" x 5   Hamstring mobilization supine with 90/90 stretch x 1 min on ea   Forward flexion with dowel x 10       Patient Education and Home Exercises     Education provided:   - PT plan of care and goals for stay     Written Home Exercises Provided: yes. Exercises were reviewed and Hue was able to demonstrate them prior to the end of the session.  Hue demonstrated good  understanding of the education provided. See EMR under Patient Instructions for exercises provided during therapy sessions.    Assessment     Hue is a 53 y.o. female referred to outpatient Physical Therapy with a medical diagnosis of E78.2 (ICD-10-CM) - Mixed " hyperlipidemia M54.40 (ICD-10-CM) - Acute right-sided low back pain with sciatica, sciatica laterality unspecified E66.9,Z68.37 (ICD-10-CM) - Class 2 obesity with body mass index (BMI) of 37.0 to 37.9 in adult, unspecified obesity type, unspecified whether serious comorbidity present. Patient presents with decrease range of motion, decrease flexibility, decrease strength, pain with mobility.     Patient prognosis is Good.   Patient will benefit from skilled outpatient Physical Therapy to address the deficits stated above and in the chart below, provide patient /family education, and to maximize patientt's level of independence.     Plan of care discussed with patient: Yes  Patient's spiritual, cultural and educational needs considered and patient is agreeable to the plan of care and goals as stated below:     Anticipated Barriers for therapy: chronic nature of pain     Medical Necessity is demonstrated by the following  History  Co-morbidities and personal factors that may impact the plan of care [] LOW: no personal factors / co-morbidities  [x] MODERATE: 1-2 personal factors / co-morbidities  [] HIGH: 3+ personal factors / co-morbidities    Moderate / High Support Documentation:   Co-morbidities affecting plan of care: history of DM     Personal Factors:   lifestyle     Examination  Body Structures and Functions, activity limitations and participation restrictions that may impact the plan of care [x] LOW: addressing 1-2 elements  [] MODERATE: 3+ elements  [] HIGH: 4+ elements (please support below)    Moderate / High Support Documentation: range of motion, flexibility, pain with mobility      Clinical Presentation [x] LOW: stable  [] MODERATE: Evolving  [] HIGH: Unstable     Decision Making/ Complexity Score: low       Goals:  Short Term Goals (3 Weeks):  1. Patient will be compliant with home exercise program to supplement therapy in promoting functional mobility.  2. Patient will perform sit to stand from chair   with good control to demonstrate improved core strength.  3. Patient will report <5/10 pain during thoracolumbar active range of motion to promote functional mobility.  4. Patient will improve impaired lower extremity bilateral manual muscle tests  to >/=4/5 to improve strength for functional tasks.  Long Term Goals (6 Weeks):   1. Patient will improve FOTO score to </= 59% limited to decrease perceived limitation with maintaining/changing body position.   2. Patient will perform supine to sit  with good control to demonstrate improved core strength.  3. Patient will improve impaired lower extremity bilateral manual muscle tests to >/=4+/5 to improve strength for functional tasks.  4. Patient will report <4/10 pain during standing for 6 minute activity.  Plan     Plan of care Certification: 8/1/2024 to 11/1/2024.    Outpatient Physical Therapy 2 times weekly for 6 weeks to include the following interventions: Cervical/Lumbar Traction, Electrical Stimulation  , Gait Training, Manual Therapy, Moist Heat/ Ice, Neuromuscular Re-ed, Orthotic Management and Training, Patient Education, Self Care, Therapeutic Activities, and Therapeutic Exercise.     Nicole Cross PT        Physician's Signature: _________________________________________ Date: ________________

## 2024-08-08 ENCOUNTER — CLINICAL SUPPORT (OUTPATIENT)
Dept: REHABILITATION | Facility: HOSPITAL | Age: 54
End: 2024-08-08
Payer: MEDICAID

## 2024-08-08 DIAGNOSIS — M54.16 LUMBAR BACK PAIN WITH RADICULOPATHY AFFECTING LOWER EXTREMITY: Primary | ICD-10-CM

## 2024-08-08 PROCEDURE — 97110 THERAPEUTIC EXERCISES: CPT | Mod: PN

## 2024-08-12 ENCOUNTER — CLINICAL SUPPORT (OUTPATIENT)
Dept: REHABILITATION | Facility: HOSPITAL | Age: 54
End: 2024-08-12
Payer: MEDICAID

## 2024-08-12 DIAGNOSIS — M54.16 LUMBAR BACK PAIN WITH RADICULOPATHY AFFECTING LOWER EXTREMITY: Primary | ICD-10-CM

## 2024-08-12 PROCEDURE — 97110 THERAPEUTIC EXERCISES: CPT | Mod: PN

## 2024-08-12 NOTE — PROGRESS NOTES
"[facility]   Physical Therapy Treatment Note      Name: Hue Orellana [facility]  Clinic Number: [id number]    Therapy Diagnosis:   Encounter Diagnosis   Name Primary?    Lumbar back pain with radiculopathy affecting lower extremity Yes       Physician: Keke Saini,*    Visit Date: 8/12/2024    Physician Orders: PT Eval and Treat   Medical Diagnosis from Referral: E78.2 (ICD-10-CM) - Mixed hyperlipidemia M54.40 (ICD-10-CM) - Acute right-sided low back pain with sciatica, sciatica laterality unspecified E66.9,Z68.37 (ICD-10-CM) - Class 2 obesity with body mass index (BMI) of 37.0 to 37.9 in adult, unspecified obesity type, unspecified whether serious comorbidity present  Evaluation Date: 8/1/2024  Authorization Period Expiration: 6/17/2025  Plan of Care Expiration: 11/1/2024  Progress Note Due: VISIT 4  Date of Surgery: N/A  Visit # / Visits authorized: 1/ 1   FOTO: 1/ 3     Precautions: Standard and Diabetes      Time In: 1345  Time Out: 1425  Total Billable Time: 40 minutes    PTA Visit #: -/5       Subjective     Patient reports: no new symptoms since initial evaluation.  She was somewhat compliant with home exercise program.  Response to previous treatment: positive  Functional change: minimal    Pain: 6/10  Location: bilateral back      Objective      Objective Measures updated at progress report unless specified.     Treatment     Hue received the treatments listed below:      therapeutic exercises to develop ROM, flexibility, and core stabilization for 40 minutes including:  2 x 10 BKTC with SB  2 x 10 lower trunk rotations with SB  Mini bridge x 10 on ball   Bent knee fall out with red theraband 2 x 15 with cuing for abdominal hold   Knee to chest with red theraband 2 x 10 on ea lower extremity   Hamstring mobilization supine with 90/90 stretch x 1 min on ea   Seated hamstring stretch 10 x 5" hold on Left lower extremity   Modified plantigrade leg lift 2 x 10 ea side "   Modified plantigrade flexion/extension with trunk x 10 with slow flow x 10   Forward flexion on ball x 10 slow   Lateral ball walk outs in sitting x 10 ea direction     hot pack for 15 minutes to lumbar spine.      Patient Education and Home Exercises       Education provided:   - Cont HEP    Written Home Exercises Provided: Patient instructed to cont prior HEP. Exercises were reviewed and Hue was able to demonstrate them prior to the end of the session.  Hue demonstrated good  understanding of the education provided. See Electronic Medical Record under Patient Instructions for exercises provided during therapy sessions    Assessment     Hue continues to demonstrate significant impairments in hamstring flexibility, especially left which is likely causing some lingering back pain. She is able to recall home exercise program but is not consistent with practice yet.  Will continue to focus on flexibility.     Hue Is progressing well towards her goals.   Patient prognosis is Good.     Patient will continue to benefit from skilled outpatient physical therapy to address the deficits listed in the problem list box on initial evaluation, provide pt/family education and to maximize pt's level of independence in the home and community environment.     Patient's spiritual, cultural and educational needs considered and pt agreeable to plan of care and goals.     Anticipated barriers to physical therapy: none    Goals: Short Term Goals (3 Weeks):  1. Patient will be compliant with home exercise program to supplement therapy in promoting functional mobility.  2. Patient will perform sit to stand from chair  with good control to demonstrate improved core strength.  3. Patient will report <5/10 pain during thoracolumbar active range of motion to promote functional mobility.  4. Patient will improve impaired lower extremity bilateral manual muscle tests  to >/=4/5 to improve strength for functional tasks.  Long Term Goals (6  Weeks):   1. Patient will improve FOTO score to </= 59% limited to decrease perceived limitation with maintaining/changing body position.   2. Patient will perform supine to sit  with good control to demonstrate improved core strength.  3. Patient will improve impaired lower extremity bilateral manual muscle tests to >/=4+/5 to improve strength for functional tasks.  4. Patient will report <4/10 pain during standing for 6 minute activity.  Plan      Plan of care Certification: 8/1/2024 to 11/1/2024.     Outpatient Physical Therapy 2 times weekly for 6 weeks to include the following interventions: Cervical/Lumbar Traction, Electrical Stimulation  , Gait Training, Manual Therapy, Moist Heat/ Ice, Neuromuscular Re-ed, Orthotic Management and Training, Patient Education, Self Care, Therapeutic Activities, and Therapeutic Exercise.       Nicole Cross, PT

## 2024-08-20 ENCOUNTER — CLINICAL SUPPORT (OUTPATIENT)
Dept: REHABILITATION | Facility: HOSPITAL | Age: 54
End: 2024-08-20
Payer: MEDICAID

## 2024-08-20 DIAGNOSIS — M54.16 LUMBAR BACK PAIN WITH RADICULOPATHY AFFECTING LOWER EXTREMITY: Primary | ICD-10-CM

## 2024-08-20 PROCEDURE — 97110 THERAPEUTIC EXERCISES: CPT | Mod: PN

## 2024-08-20 NOTE — PROGRESS NOTES
[facility]   Physical Therapy Treatment Note      Name: Hue Hernandez  Clinic Number: [id number]    Therapy Diagnosis:   Encounter Diagnosis   Name Primary?    Lumbar back pain with radiculopathy affecting lower extremity Yes       Physician: Keke Saini,*    Visit Date: 8/20/2024    Physician Orders: PT Eval and Treat   Medical Diagnosis from Referral: E78.2 (ICD-10-CM) - Mixed hyperlipidemia M54.40 (ICD-10-CM) - Acute right-sided low back pain with sciatica, sciatica laterality unspecified E66.9,Z68.37 (ICD-10-CM) - Class 2 obesity with body mass index (BMI) of 37.0 to 37.9 in adult, unspecified obesity type, unspecified whether serious comorbidity present  Evaluation Date: 8/1/2024  Authorization Period Expiration: 12/31/2024  Plan of Care Expiration: 11/1/2024  Progress Note Due: VISIT 4  Date of Surgery: N/A  Visit # / Visits authorized: 3/20  FOTO: 1/ 3     Precautions: Standard and Diabetes      Time In: 1340  Time Out: 1420  Total Billable Time: 40 minutes    PTA Visit #: -/5       Subjective     Patient reports: no new symptoms since initial evaluation.  She was somewhat compliant with home exercise program.  Response to previous treatment: positive  Functional change: minimal    Pain: 6/10  Location: bilateral back      Objective      Updated on 8/20/2024    Observation: labored sit to stand from lobby and antalgic gait noted      Posture:  elevated left PSIS, iliac crest, increase lordosis noted      Lumbar Range of Motion:     Degrees Pain   Flexion 75%    -         Extension 100%    Feels good          Left Side Bending At knee          Right Side Bending At knee  *         Left rotation    100% -         Right Rotation    100% -               Lower Extremity Strength  Right LE   Left LE     Knee extension: 5/5 Knee extension: 5/5   Knee flexion: 5/5 Knee flexion: 5/5   Hip flexion: 4/5 Hip flexion: 4/5   Hip extension:  5/5 Hip extension: 5/5   Hip  "abduction: 5/5 Hip abduction: 5/5   Hip adduction: 5/5 Hip adduction 5/5   Ankle dorsiflexion: 5/5 Ankle dorsiflexion: 5/5   Ankle plantarflexion: 5/5 Ankle plantarflexion: 5/5        Flexibility: hamstring left >right      Intake Outcome Measure for FOTO Lumbar Survey     Therapist reviewed FOTO scores for Hue Hernandez on 8/1/2024.   FOTO report - see Media section or FOTO account episode details.     Intake Score: 72%           Treatment     Hue received the treatments listed below:      therapeutic exercises to develop ROM, flexibility, and core stabilization for 40 minutes including:  Re-assessment completed on this date for total of 10 min   2 x 10 BKTC with SB  2 x 10 lower trunk rotations with SB  Mini bridge 2 x 10 on ball   Bent knee fall out with red theraband 2 x 15 with cuing for abdominal hold   Knee to chest with red theraband 2 x 10 on ea lower extremity   Hamstring mobilization supine with 90/90 stretch x 2 min on ea   Prone press ups x 10   Seated hamstring stretch 10 x 5" hold on Left lower extremity   Modified plantigrade leg lift 2 x 10 ea side   Thread the needle in modified plantigrade x 10   Forward flexion on ball x 10 slow   Lateral ball walk outs in sitting x 10 ea direction     hot pack for 15 minutes to lumbar spine.      Patient Education and Home Exercises       Education provided:   - Cont HEP    Written Home Exercises Provided: Patient instructed to cont prior HEP. Exercises were reviewed and Hue was able to demonstrate them prior to the end of the session.  Hue demonstrated good  understanding of the education provided. See Electronic Medical Record under Patient Instructions for exercises provided during therapy sessions    Assessment     Hue is inconsistent with home exercise program performance, she otherwise met 3/4 short term goals. She had significantly improved range of motion noted, and extension continues to be position of comfort. She continues to progress " strengthening exercises without issue.     Hue Is progressing well towards her goals.   Patient prognosis is Good.     Patient will continue to benefit from skilled outpatient physical therapy to address the deficits listed in the problem list box on initial evaluation, provide pt/family education and to maximize pt's level of independence in the home and community environment.     Patient's spiritual, cultural and educational needs considered and pt agreeable to plan of care and goals.     Anticipated barriers to physical therapy: none    Goals: Short Term Goals (3 Weeks):  1. Patient will be compliant with home exercise program to supplement therapy in promoting functional mobility. NOT MET   2. Patient will perform sit to stand from chair  with good control to demonstrate improved core strength. GOAL MET   3. Patient will report <5/10 pain during thoracolumbar active range of motion to promote functional mobility. GOAL MET   4. Patient will improve impaired lower extremity bilateral manual muscle tests  to >/=4/5 to improve strength for functional tasks. GOAL MET   Long Term Goals (6 Weeks):   1. Patient will improve FOTO score to </= 59% limited to decrease perceived limitation with maintaining/changing body position.   2. Patient will perform supine to sit  with good control to demonstrate improved core strength.  3. Patient will improve impaired lower extremity bilateral manual muscle tests to >/=4+/5 to improve strength for functional tasks.  4. Patient will report <4/10 pain during standing for 6 minute activity.  Plan      Plan of care Certification: 8/1/2024 to 11/1/2024.     Outpatient Physical Therapy 2 times weekly for 6 weeks to include the following interventions: Cervical/Lumbar Traction, Electrical Stimulation  , Gait Training, Manual Therapy, Moist Heat/ Ice, Neuromuscular Re-ed, Orthotic Management and Training, Patient Education, Self Care, Therapeutic Activities, and Therapeutic Exercise.        Nicole Cross, PT

## 2024-08-21 PROBLEM — Z79.4 TYPE 2 DIABETES MELLITUS WITH HYPOGLYCEMIA WITHOUT COMA, WITH LONG-TERM CURRENT USE OF INSULIN: Status: ACTIVE | Noted: 2024-08-21

## 2024-08-21 PROBLEM — E11.649 TYPE 2 DIABETES MELLITUS WITH HYPOGLYCEMIA WITHOUT COMA, WITH LONG-TERM CURRENT USE OF INSULIN: Status: ACTIVE | Noted: 2024-08-21

## 2024-08-27 ENCOUNTER — CLINICAL SUPPORT (OUTPATIENT)
Dept: REHABILITATION | Facility: HOSPITAL | Age: 54
End: 2024-08-27
Payer: MEDICAID

## 2024-08-27 DIAGNOSIS — M54.16 LUMBAR BACK PAIN WITH RADICULOPATHY AFFECTING LOWER EXTREMITY: Primary | ICD-10-CM

## 2024-08-27 PROCEDURE — 97110 THERAPEUTIC EXERCISES: CPT | Mod: PN,CQ

## 2024-08-29 ENCOUNTER — CLINICAL SUPPORT (OUTPATIENT)
Dept: REHABILITATION | Facility: HOSPITAL | Age: 54
End: 2024-08-29
Payer: MEDICAID

## 2024-08-29 DIAGNOSIS — M54.16 LUMBAR BACK PAIN WITH RADICULOPATHY AFFECTING LOWER EXTREMITY: Primary | ICD-10-CM

## 2024-08-29 PROCEDURE — 97110 THERAPEUTIC EXERCISES: CPT | Mod: PN

## 2024-08-29 NOTE — PROGRESS NOTES
[facility]   Physical Therapy Treatment Note      Name: Hue Orellana Hernandez  Clinic Number: [id number]    Physician: Keke Saini,*    Visit Date: 8/29/2024    Physician Orders: PT Eval and Treat   Medical Diagnosis from Referral: E78.2 (ICD-10-CM) - Mixed hyperlipidemia M54.40 (ICD-10-CM) - Acute right-sided low back pain with sciatica, sciatica laterality unspecified E66.9,Z68.37 (ICD-10-CM) - Class 2 obesity with body mass index (BMI) of 37.0 to 37.9 in adult, unspecified obesity type, unspecified whether serious comorbidity present  Evaluation Date: 8/1/2024  Authorization Period Expiration: 12/31/2024  Plan of Care Expiration: 11/1/2024  Progress Note Due: VISIT 4  Date of Surgery: N/A  Visit # / Visits authorized: 4/20  FOTO: 1/ 3     Precautions: Standard and Diabetes      Time In: 1300  Time Out: 1340  Total Billable Time: 40 minutes    PTA Visit #: -/5       Subjective     Patient reports: no new c/o pain or discomfort.   She was somewhat compliant with home exercise program.  Response to previous treatment: positive  Functional change: minimal    Pain: 7/10  Location: bilateral back      Objective      Updated on 8/20/2024    Observation: labored sit to stand from lobby and antalgic gait noted      Posture:  elevated left PSIS, iliac crest, increase lordosis noted      Lumbar Range of Motion:     Degrees Pain   Flexion 75%    -         Extension 100%    Feels good          Left Side Bending At knee          Right Side Bending At knee  *         Left rotation    100% -         Right Rotation    100% -               Lower Extremity Strength  Right LE   Left LE     Knee extension: 5/5 Knee extension: 5/5   Knee flexion: 5/5 Knee flexion: 5/5   Hip flexion: 4/5 Hip flexion: 4/5   Hip extension:  5/5 Hip extension: 5/5   Hip abduction: 5/5 Hip abduction: 5/5   Hip adduction: 5/5 Hip adduction 5/5   Ankle dorsiflexion: 5/5 Ankle dorsiflexion: 5/5   Ankle plantarflexion: 5/5  "Ankle plantarflexion: 5/5        Flexibility: hamstring left >right      Intake Outcome Measure for FOTO Lumbar Survey     Therapist reviewed FOTO scores for Hue Hernandez on 8/1/2024.   FOTO report - see Media section or FOTO account episode details.     Intake Score: 72%           Treatment     Hue received the treatments listed below:      hot pack for 15 minutes to lumbar spine while performing supine ther ex.     therapeutic exercises to develop ROM, flexibility, and core stabilization for 39 minutes including:    2 x 10 BKTC with SB  2 x 10 lower trunk rotations with SB  Mini bridge 2 x 10 on ball   Bent knee fall out with red theraband 2 x 15 with cuing for abdominal hold   Knee to chest with red theraband 2 x 10 on ea lower extremity   Hamstring mobilization supine with 90/90 stretch x 2 min on ea   Prone press ups x 10   Seated hamstring stretch 10 x 5" hold on Left lower extremity   Modified plantigrade leg lift 2 x 10 ea side   Thread the needle in modified plantigrade x 10   Forward flexion on ball x 10 slow   Lateral ball walk outs in sitting x 10 ea direction       Patient Education and Home Exercises       Education provided:   - Cont HEP    Written Home Exercises Provided: Patient instructed to cont prior HEP. Exercises were reviewed and Hue was able to demonstrate them prior to the end of the session.  Hue demonstrated good  understanding of the education provided. See Electronic Medical Record under Patient Instructions for exercises provided during therapy sessions    Assessment     Pt performed ex's with good understanding, limited ROM. No exacerbation of pain reported post session.     Hue Is progressing well towards her goals.   Patient prognosis is Good.     Patient will continue to benefit from skilled outpatient physical therapy to address the deficits listed in the problem list box on initial evaluation, provide pt/family education and to maximize pt's level of independence in " the home and community environment.     Patient's spiritual, cultural and educational needs considered and pt agreeable to plan of care and goals.     Anticipated barriers to physical therapy: none    Goals: Short Term Goals (3 Weeks):  1. Patient will be compliant with home exercise program to supplement therapy in promoting functional mobility. NOT MET   2. Patient will perform sit to stand from chair  with good control to demonstrate improved core strength. GOAL MET   3. Patient will report <5/10 pain during thoracolumbar active range of motion to promote functional mobility. GOAL MET   4. Patient will improve impaired lower extremity bilateral manual muscle tests  to >/=4/5 to improve strength for functional tasks. GOAL MET   Long Term Goals (6 Weeks):   1. Patient will improve FOTO score to </= 59% limited to decrease perceived limitation with maintaining/changing body position.   2. Patient will perform supine to sit  with good control to demonstrate improved core strength.  3. Patient will improve impaired lower extremity bilateral manual muscle tests to >/=4+/5 to improve strength for functional tasks.  4. Patient will report <4/10 pain during standing for 6 minute activity.  Plan      Plan of care Certification: 8/1/2024 to 11/1/2024.     Outpatient Physical Therapy 2 times weekly for 6 weeks to include the following interventions: Cervical/Lumbar Traction, Electrical Stimulation  , Gait Training, Manual Therapy, Moist Heat/ Ice, Neuromuscular Re-ed, Orthotic Management and Training, Patient Education, Self Care, Therapeutic Activities, and Therapeutic Exercise.       Kira Summers, PT

## 2024-09-03 ENCOUNTER — CLINICAL SUPPORT (OUTPATIENT)
Dept: REHABILITATION | Facility: HOSPITAL | Age: 54
End: 2024-09-03
Payer: MEDICAID

## 2024-09-03 DIAGNOSIS — M54.16 LUMBAR BACK PAIN WITH RADICULOPATHY AFFECTING LOWER EXTREMITY: Primary | ICD-10-CM

## 2024-09-03 PROCEDURE — 97110 THERAPEUTIC EXERCISES: CPT | Mod: PN,CQ

## 2024-09-03 NOTE — PROGRESS NOTES
"[facility]   Physical Therapy Treatment Note      Name: Hue Mcez  Clinic Number: [id number]    Physician: Keke Saini,*    Visit Date: 9/3/2024    Physician Orders: PT Eval and Treat   Medical Diagnosis from Referral: E78.2 (ICD-10-CM) - Mixed hyperlipidemia M54.40 (ICD-10-CM) - Acute right-sided low back pain with sciatica, sciatica laterality unspecified E66.9,Z68.37 (ICD-10-CM) - Class 2 obesity with body mass index (BMI) of 37.0 to 37.9 in adult, unspecified obesity type, unspecified whether serious comorbidity present  Evaluation Date: 8/1/2024  Authorization Period Expiration: 12/31/2024  Plan of Care Expiration: 11/1/2024  Progress Note Due: VISIT 4  Date of Surgery: N/A  Visit # / Visits authorized: 6/20  FOTO: 1/ 3     Precautions: Standard and Diabetes      Time In: 1346  Time Out: 1425  Total Billable Time: 39 minutes    PTA Visit #: 1/5       Subjective     Patient reports: "I feel like shit." She states she is "really hurting" but would like to push through participation today.   She was somewhat compliant with home exercise program.  Response to previous treatment: positive  Functional change: minimal    Pain: 8/10  Location: bilateral back      Objective      Updated on 8/20/2024    Observation: labored sit to stand from lobby and antalgic gait noted      Posture:  elevated left PSIS, iliac crest, increase lordosis noted      Lumbar Range of Motion:     Degrees Pain   Flexion 75%    -         Extension 100%    Feels good          Left Side Bending At knee          Right Side Bending At knee  *         Left rotation    100% -         Right Rotation    100% -               Lower Extremity Strength  Right LE   Left LE     Knee extension: 5/5 Knee extension: 5/5   Knee flexion: 5/5 Knee flexion: 5/5   Hip flexion: 4/5 Hip flexion: 4/5   Hip extension:  5/5 Hip extension: 5/5   Hip abduction: 5/5 Hip abduction: 5/5   Hip adduction: 5/5 Hip adduction 5/5 " "  Ankle dorsiflexion: 5/5 Ankle dorsiflexion: 5/5   Ankle plantarflexion: 5/5 Ankle plantarflexion: 5/5        Flexibility: hamstring left >right      Intake Outcome Measure for FOTO Lumbar Survey     Therapist reviewed FOTO scores for Hue Hernandez on 8/1/2024.   FOTO report - see Media section or FOTO account episode details.     Intake Score: 72%           Treatment     Hue received the treatments listed below:      hot pack for 15 minutes to lumbar spine while performing supine ther ex.     therapeutic exercises to develop ROM, flexibility, and core stabilization for 39 minutes including:    2 x 10 BKTC with SB  2 x 10 lower trunk rotations with SB  Mini bridge 2 x 10 on ball  Bent knee fall out with red theraband 2 x 15 with cuing for abdominal hold   Knee to chest with red theraband 2 x 10 on ea lower extremity  Prone press ups x 10   Seated hamstring stretch 10 x 5" hold on Left lower extremity  Modified plantigrade leg lift 2 x 10 ea side   Thread the needle in modified plantigrade x 10   Forward flexion on ball x 10 slow   Lateral ball walk outs in sitting x 10 ea direction  Forward swiss ball roll out in sitting x 10   Modified plantigrade 3# row 2x10    Not performed today:  Hamstring mobilization supine with 90/90 stretch x 2 min on ea       Patient Education and Home Exercises       Education provided:   - Cont HEP    Written Home Exercises Provided: Patient instructed to cont prior HEP. Exercises were reviewed and Hue was able to demonstrate them prior to the end of the session.  Hue demonstrated good  understanding of the education provided. See Electronic Medical Record under Patient Instructions for exercises provided during therapy sessions    Assessment     Slight addition of ex's, which pt was able to complete without complaint. She did state post session that she gets "the shakes" after she comes to PT b/c of her "weak muscles".     Hue Is progressing well towards her goals. "   Patient prognosis is Good.     Patient will continue to benefit from skilled outpatient physical therapy to address the deficits listed in the problem list box on initial evaluation, provide pt/family education and to maximize pt's level of independence in the home and community environment.     Patient's spiritual, cultural and educational needs considered and pt agreeable to plan of care and goals.     Anticipated barriers to physical therapy: none    Goals: Short Term Goals (3 Weeks):  1. Patient will be compliant with home exercise program to supplement therapy in promoting functional mobility. NOT MET   2. Patient will perform sit to stand from chair  with good control to demonstrate improved core strength. GOAL MET   3. Patient will report <5/10 pain during thoracolumbar active range of motion to promote functional mobility. GOAL MET   4. Patient will improve impaired lower extremity bilateral manual muscle tests  to >/=4/5 to improve strength for functional tasks. GOAL MET   Long Term Goals (6 Weeks):   1. Patient will improve FOTO score to </= 59% limited to decrease perceived limitation with maintaining/changing body position.   2. Patient will perform supine to sit  with good control to demonstrate improved core strength.  3. Patient will improve impaired lower extremity bilateral manual muscle tests to >/=4+/5 to improve strength for functional tasks.  4. Patient will report <4/10 pain during standing for 6 minute activity.  Plan      Plan of care Certification: 8/1/2024 to 11/1/2024.     Outpatient Physical Therapy 2 times weekly for 6 weeks to include the following interventions: Cervical/Lumbar Traction, Electrical Stimulation  , Gait Training, Manual Therapy, Moist Heat/ Ice, Neuromuscular Re-ed, Orthotic Management and Training, Patient Education, Self Care, Therapeutic Activities, and Therapeutic Exercise.       Kelly Salazar, PTA

## 2024-09-05 ENCOUNTER — CLINICAL SUPPORT (OUTPATIENT)
Dept: REHABILITATION | Facility: HOSPITAL | Age: 54
End: 2024-09-05
Payer: MEDICAID

## 2024-09-05 DIAGNOSIS — M54.16 LUMBAR BACK PAIN WITH RADICULOPATHY AFFECTING LOWER EXTREMITY: Primary | ICD-10-CM

## 2024-09-05 PROCEDURE — 97110 THERAPEUTIC EXERCISES: CPT | Mod: PN

## 2024-09-05 NOTE — PROGRESS NOTES
[facility]   Physical Therapy Treatment Note      Name: Hue Mcez  Clinic Number: [id number]    Physician: Keke Saini,*    Visit Date: 9/5/2024    Physician Orders: PT Eval and Treat   Medical Diagnosis from Referral: E78.2 (ICD-10-CM) - Mixed hyperlipidemia M54.40 (ICD-10-CM) - Acute right-sided low back pain with sciatica, sciatica laterality unspecified E66.9,Z68.37 (ICD-10-CM) - Class 2 obesity with body mass index (BMI) of 37.0 to 37.9 in adult, unspecified obesity type, unspecified whether serious comorbidity present  Evaluation Date: 8/1/2024  Authorization Period Expiration: 12/31/2024  Plan of Care Expiration: 11/1/2024  Progress Note Due: VISIT 4  Date of Surgery: N/A  Visit # / Visits authorized: 6/20  FOTO: 1/ 3     Precautions: Standard and Diabetes      Time In: 1346  Time Out: 1425  Total Billable Time: 39 minutes    PTA Visit #: 1/5       Subjective     Patient reports:she is feeling minimal pain in her low back today.  She was somewhat compliant with home exercise program.  Response to previous treatment: positive  Functional change: minimal    Pain: 8/10  Location: bilateral back      Objective      Updated on 8/20/2024    Observation: labored sit to stand from lobby and antalgic gait noted      Posture:  elevated left PSIS, iliac crest, increase lordosis noted      Lumbar Range of Motion:     Degrees Pain   Flexion 75%    -         Extension 100%    Feels good          Left Side Bending At knee          Right Side Bending At knee  *         Left rotation    100% -         Right Rotation    100% -               Lower Extremity Strength  Right LE   Left LE     Knee extension: 5/5 Knee extension: 5/5   Knee flexion: 5/5 Knee flexion: 5/5   Hip flexion: 4/5 Hip flexion: 4/5   Hip extension:  5/5 Hip extension: 5/5   Hip abduction: 5/5 Hip abduction: 5/5   Hip adduction: 5/5 Hip adduction 5/5   Ankle dorsiflexion: 5/5 Ankle dorsiflexion: 5/5   Ankle  "plantarflexion: 5/5 Ankle plantarflexion: 5/5        Flexibility: hamstring left >right      Intake Outcome Measure for FOTO Lumbar Survey     Therapist reviewed FOTO scores for Hue Hernandez on 8/1/2024.   FOTO report - see Media section or FOTO account episode details.     Intake Score: 72%           Treatment     Hue received the treatments listed below:      hot pack for 15 minutes to lumbar spine while performing supine ther ex.     therapeutic exercises to develop ROM, flexibility, and core stabilization for 39 minutes including:    2 x 10 BKTC with SB  2 x 10 lower trunk rotations with SB  Mini bridge 2 x 10 on ball  Bent knee fall out with red theraband 2 x 15 with cuing for abdominal hold   Knee to chest with red theraband 2 x 10 on ea lower extremity  Prone press ups x 10   Seated hamstring stretch 10 x 5" hold on Left lower extremity  Modified plantigrade leg lift 2 x 10 ea side   Thread the needle in modified plantigrade x 10   Forward flexion on ball x 10 slow   Lateral ball walk outs in sitting x 10 ea direction  Forward swiss ball roll out in sitting x 10   Modified plantigrade 3# row 2x10    Not performed today:  Hamstring mobilization supine with 90/90 stretch x 2 min on ea       Patient Education and Home Exercises       Education provided:   - Cont HEP    Written Home Exercises Provided: Patient instructed to cont prior HEP. Exercises were reviewed and Hue was able to demonstrate them prior to the end of the session.  Hue demonstrated good  understanding of the education provided. See Electronic Medical Record under Patient Instructions for exercises provided during therapy sessions    Assessment     Patient tolerated today's treatment well with no increased reports of pain or discomfort. Patient with no c/o pain post-treatment.    Hue Is progressing well towards her goals.   Patient prognosis is Good.     Patient will continue to benefit from skilled outpatient physical therapy to " address the deficits listed in the problem list box on initial evaluation, provide pt/family education and to maximize pt's level of independence in the home and community environment.     Patient's spiritual, cultural and educational needs considered and pt agreeable to plan of care and goals.     Anticipated barriers to physical therapy: none    Goals: Short Term Goals (3 Weeks):  1. Patient will be compliant with home exercise program to supplement therapy in promoting functional mobility. NOT MET   2. Patient will perform sit to stand from chair  with good control to demonstrate improved core strength. GOAL MET   3. Patient will report <5/10 pain during thoracolumbar active range of motion to promote functional mobility. GOAL MET   4. Patient will improve impaired lower extremity bilateral manual muscle tests  to >/=4/5 to improve strength for functional tasks. GOAL MET   Long Term Goals (6 Weeks):   1. Patient will improve FOTO score to </= 59% limited to decrease perceived limitation with maintaining/changing body position.   2. Patient will perform supine to sit  with good control to demonstrate improved core strength.  3. Patient will improve impaired lower extremity bilateral manual muscle tests to >/=4+/5 to improve strength for functional tasks.  4. Patient will report <4/10 pain during standing for 6 minute activity.  Plan      Plan of care Certification: 8/1/2024 to 11/1/2024.     Outpatient Physical Therapy 2 times weekly for 6 weeks to include the following interventions: Cervical/Lumbar Traction, Electrical Stimulation  , Gait Training, Manual Therapy, Moist Heat/ Ice, Neuromuscular Re-ed, Orthotic Management and Training, Patient Education, Self Care, Therapeutic Activities, and Therapeutic Exercise.       Kira Summers, PT

## 2024-09-17 ENCOUNTER — CLINICAL SUPPORT (OUTPATIENT)
Dept: REHABILITATION | Facility: HOSPITAL | Age: 54
End: 2024-09-17
Payer: MEDICAID

## 2024-09-17 DIAGNOSIS — M54.16 LUMBAR BACK PAIN WITH RADICULOPATHY AFFECTING LOWER EXTREMITY: Primary | ICD-10-CM

## 2024-09-17 PROCEDURE — 97110 THERAPEUTIC EXERCISES: CPT | Mod: PN

## 2024-09-17 NOTE — PROGRESS NOTES
"[facility]   Physical Therapy Treatment Note      Name: Hue Hernandez  Clinic Number: [id number]    Physician: Keke Saini,*    Visit Date: 9/17/2024    Physician Orders: PT Eval and Treat   Medical Diagnosis from Referral: E78.2 (ICD-10-CM) - Mixed hyperlipidemia M54.40 (ICD-10-CM) - Acute right-sided low back pain with sciatica, sciatica laterality unspecified E66.9,Z68.37 (ICD-10-CM) - Class 2 obesity with body mass index (BMI) of 37.0 to 37.9 in adult, unspecified obesity type, unspecified whether serious comorbidity present  Evaluation Date: 8/1/2024  Authorization Period Expiration: 12/31/2024  Plan of Care Expiration: 11/1/2024  Progress Note Due: VISIT 4  Date of Surgery: N/A  Visit # / Visits authorized: 8/20  FOTO: 1/ 3     Precautions: Standard and Diabetes      Time In: 1510  Time Out: 1550  Total Billable Time: 40 minutes    PTA Visit #: 1/5       Subjective     Patient reports: her sciatica is "killing" her on this date.   She was not compliant with home exercise program.  Response to previous treatment: positive  Functional change: minimal    Pain:8 /10  Location: bilateral back      Objective      Updated on 8/20/2024    Observation: labored sit to stand from lobby and antalgic gait noted      Posture:  elevated left PSIS, iliac crest, increase lordosis noted      Lumbar Range of Motion:     Degrees Pain   Flexion 75%    -         Extension 100%    Feels good          Left Side Bending At knee          Right Side Bending At knee  *         Left rotation    100% -         Right Rotation    100% -               Lower Extremity Strength  Right LE   Left LE     Knee extension: 5/5 Knee extension: 5/5   Knee flexion: 5/5 Knee flexion: 5/5   Hip flexion: 4/5 Hip flexion: 4/5   Hip extension:  5/5 Hip extension: 5/5   Hip abduction: 5/5 Hip abduction: 5/5   Hip adduction: 5/5 Hip adduction 5/5   Ankle dorsiflexion: 5/5 Ankle dorsiflexion: 5/5   Ankle " "plantarflexion: 5/5 Ankle plantarflexion: 5/5        Flexibility: hamstring left >right      Intake Outcome Measure for FOTO Lumbar Survey     Therapist reviewed FOTO scores for Hue Hernandez on 8/1/2024.   FOTO report - see Media section or FOTO account episode details.     Intake Score: 72%           Treatment     Hue received the treatments listed below:      hot pack for 15 minutes to lumbar spine while performing supine ther ex.     therapeutic exercises to develop ROM, flexibility, and core stabilization for 40 minutes including:    2 x 10 BKTC with SB  2 x 10 lower trunk rotations with SB  Mini bridge 2 x 10 on ball  Leg lift on/off ball 2 x 15 ea lower extremity   Bent knee fall out with green theraband 2 x 15 with cuing for abdominal hold   Knee to chest with green theraband 2 x 15 on ea lower extremity  Hamstring mobilization supine with 90/90 stretch x 1 min on ea  Dead bugs with lower extremity only x 10 ea side   Modified pike to extension 2 x 10   Elevated planks with 30" hold x  3   Forward flexion with dowel x 10 slow   Manual traction in supine for total of 10 min     Not performed today:  Modified plantigrade 3# row 2x10  Modified plantigrade leg lift 2 x 10 ea side   Thread the needle in modified plantigrade x 10   Prone press ups x 10    Lateral ball walk outs in sitting x 10 ea direction  Forward swiss ball roll out in sitting x 10       Patient Education and Home Exercises       Education provided:   - Cont HEP    Written Home Exercises Provided: Patient instructed to cont prior HEP. Exercises were reviewed and Hue was able to demonstrate them prior to the end of the session.  Hue demonstrated good  understanding of the education provided. See Electronic Medical Record under Patient Instructions for exercises provided during therapy sessions    Assessment     Hue had pain relief with traction and heat, although she only tolerated it for short period of time. Without carryover at " home, not expecting much change in pain. She does express desire for more manual techniques going forward.     Hue Is progressing well towards her goals.   Patient prognosis is Good.     Patient will continue to benefit from skilled outpatient physical therapy to address the deficits listed in the problem list box on initial evaluation, provide pt/family education and to maximize pt's level of independence in the home and community environment.     Patient's spiritual, cultural and educational needs considered and pt agreeable to plan of care and goals.     Anticipated barriers to physical therapy: none    Goals: Short Term Goals (3 Weeks):  1. Patient will be compliant with home exercise program to supplement therapy in promoting functional mobility. NOT MET   2. Patient will perform sit to stand from chair  with good control to demonstrate improved core strength. GOAL MET   3. Patient will report <5/10 pain during thoracolumbar active range of motion to promote functional mobility. GOAL MET   4. Patient will improve impaired lower extremity bilateral manual muscle tests  to >/=4/5 to improve strength for functional tasks. GOAL MET   Long Term Goals (6 Weeks):   1. Patient will improve FOTO score to </= 59% limited to decrease perceived limitation with maintaining/changing body position.   2. Patient will perform supine to sit  with good control to demonstrate improved core strength.  3. Patient will improve impaired lower extremity bilateral manual muscle tests to >/=4+/5 to improve strength for functional tasks.  4. Patient will report <4/10 pain during standing for 6 minute activity.  Plan      Plan of care Certification: 8/1/2024 to 11/1/2024.     Outpatient Physical Therapy 2 times weekly for 6 weeks to include the following interventions: Cervical/Lumbar Traction, Electrical Stimulation  , Gait Training, Manual Therapy, Moist Heat/ Ice, Neuromuscular Re-ed, Orthotic Management and Training, Patient  Education, Self Care, Therapeutic Activities, and Therapeutic Exercise.       Nicole Cross, PT

## 2024-10-16 PROBLEM — Z79.4 TYPE 2 DIABETES MELLITUS WITH HYPERGLYCEMIA, WITH LONG-TERM CURRENT USE OF INSULIN: Status: RESOLVED | Noted: 2023-10-18 | Resolved: 2024-10-16

## 2024-10-16 PROBLEM — I50.30 HEART FAILURE WITH PRESERVED EJECTION FRACTION, NYHA CLASS II: Status: ACTIVE | Noted: 2024-10-16

## 2024-10-16 PROBLEM — N18.30 CKD STAGE 3 DUE TO TYPE 2 DIABETES MELLITUS: Status: RESOLVED | Noted: 2019-07-30 | Resolved: 2024-10-16

## 2024-10-16 PROBLEM — Z92.89 H/O CARDIOVASCULAR STRESS TEST: Status: ACTIVE | Noted: 2024-10-16

## 2024-10-16 PROBLEM — I36.1 NONRHEUMATIC TRICUSPID VALVE REGURGITATION: Status: ACTIVE | Noted: 2024-10-16

## 2024-10-16 PROBLEM — Z79.4 TYPE 2 DIABETES MELLITUS WITH HYPOGLYCEMIA WITHOUT COMA, WITH LONG-TERM CURRENT USE OF INSULIN: Status: RESOLVED | Noted: 2024-08-21 | Resolved: 2024-10-16

## 2024-10-16 PROBLEM — E11.42 TYPE 2 DIABETES MELLITUS WITH PERIPHERAL NEUROPATHY: Status: RESOLVED | Noted: 2023-10-18 | Resolved: 2024-10-16

## 2024-10-16 PROBLEM — E11.649 TYPE 2 DIABETES MELLITUS WITH HYPOGLYCEMIA WITHOUT COMA, WITH LONG-TERM CURRENT USE OF INSULIN: Status: RESOLVED | Noted: 2024-08-21 | Resolved: 2024-10-16

## 2024-10-16 PROBLEM — I34.0 NONRHEUMATIC MITRAL VALVE REGURGITATION: Status: ACTIVE | Noted: 2024-10-16

## 2024-10-16 PROBLEM — E11.22 CKD STAGE 3 DUE TO TYPE 2 DIABETES MELLITUS: Status: RESOLVED | Noted: 2019-07-30 | Resolved: 2024-10-16

## 2024-10-16 PROBLEM — E11.65 TYPE 2 DIABETES MELLITUS WITH HYPERGLYCEMIA, WITH LONG-TERM CURRENT USE OF INSULIN: Status: RESOLVED | Noted: 2023-10-18 | Resolved: 2024-10-16

## 2024-10-30 ENCOUNTER — HOSPITAL ENCOUNTER (OUTPATIENT)
Dept: SLEEP MEDICINE | Facility: HOSPITAL | Age: 54
Discharge: HOME | End: 2024-10-30
Payer: MEDICAID

## 2024-10-30 DIAGNOSIS — G47.33 OSA ON CPAP: ICD-10-CM

## 2024-10-30 DIAGNOSIS — R06.09 DOE (DYSPNEA ON EXERTION): ICD-10-CM

## 2024-10-30 PROCEDURE — 95811 POLYSOM 6/>YRS CPAP 4/> PARM: CPT

## 2024-11-13 ENCOUNTER — CLINICAL SUPPORT (OUTPATIENT)
Dept: SMOKING CESSATION | Facility: CLINIC | Age: 54
End: 2024-11-13
Payer: COMMERCIAL

## 2024-11-13 DIAGNOSIS — F17.200 NICOTINE DEPENDENCE: Primary | ICD-10-CM

## 2024-11-13 PROCEDURE — 99999 PR PBB SHADOW E&M-EST. PATIENT-LVL II: CPT | Mod: PBBFAC,,,

## 2024-11-13 PROCEDURE — 99404 PREV MED CNSL INDIV APPRX 60: CPT | Mod: S$GLB,,,

## 2024-11-13 RX ORDER — VARENICLINE TARTRATE 0.5 (11)-1
KIT ORAL 2 TIMES DAILY
Qty: 53 EACH | Refills: 0 | Status: SHIPPED | OUTPATIENT
Start: 2024-11-13

## 2024-11-20 ENCOUNTER — CLINICAL SUPPORT (OUTPATIENT)
Dept: SMOKING CESSATION | Facility: CLINIC | Age: 54
End: 2024-11-20
Payer: COMMERCIAL

## 2024-11-20 DIAGNOSIS — F17.200 NICOTINE DEPENDENCE: Primary | ICD-10-CM

## 2024-11-20 PROCEDURE — 99404 PREV MED CNSL INDIV APPRX 60: CPT | Mod: S$GLB,,,

## 2024-11-20 PROCEDURE — 99999 PR PBB SHADOW E&M-EST. PATIENT-LVL I: CPT | Mod: PBBFAC,,,

## 2024-11-20 NOTE — PROGRESS NOTES
Individual Follow-Up Form    11/20/2024    Quit Date: TBD    Clinical Status of Patient: Outpatient    Length of Service: 60 minutes    Continuing Medication: yes  Chantix    Other Medications: none     Target Symptoms: Withdrawal and medication side effects. The following were  rated moderate (3) to severe (4) on TCRS:  Moderate (3): desire/crave tobacco  Severe (4): none    Comments: Patient seen in clinic regarding follow up for smoking cessation. Ms. Swann was able to keep track of amount smoked each day. She is down to 13-15 cpd, consistently. Commended pt for accomplishments thus far. New goal is to decrease to 10 cpd. Encouraged pt to break her day into 2 parts allowing herself 5 in the morning and 5 in the afternoon. Pt started cessation medication 1 mg Chantix BID with no adverse effects, low moods, or abnormal changes in behaviors noted at this time. Identified patient's personal reason for wanting to quit as health, she is experiencing cardiac issues. Reviewed learned addiction model, trigger, cues, and short/long term consequences of tobacco use. Pt identifies her personal triggers as withdrawal upon waking, following meals, stress, and boredom. Pt's exhaled carbon monoxide level was 14 ppm as per Smokerlyzer. (non- smoker = 0-5 ppm.) Goal quit date is TBD.  FU in 1 week.     Diagnosis: F17.200    Next Visit: 1 week

## 2024-11-27 ENCOUNTER — CLINICAL SUPPORT (OUTPATIENT)
Dept: SMOKING CESSATION | Facility: CLINIC | Age: 54
End: 2024-11-27
Payer: COMMERCIAL

## 2024-11-27 DIAGNOSIS — F17.200 NICOTINE DEPENDENCE: Primary | ICD-10-CM

## 2024-11-27 PROCEDURE — 99404 PREV MED CNSL INDIV APPRX 60: CPT | Mod: S$GLB,,,

## 2024-11-27 PROCEDURE — 99999 PR PBB SHADOW E&M-EST. PATIENT-LVL II: CPT | Mod: PBBFAC,,,

## 2024-11-27 NOTE — PROGRESS NOTES
Individual Follow-Up Form    11/27/2024    Quit Date: TBD    Clinical Status of Patient: Outpatient    Length of Service: 60 minutes    Continuing Medication: yes  Chantix    Other Medications: none     Target Symptoms: Withdrawal and medication side effects. The following were  rated moderate (3) to severe (4) on TCRS:  Moderate (3): desire/crave tobacco  Severe (4): none    Comments: Patient seen in clinic regarding smoking cessation follow up. Ms Swann reports that she was able to meet goal of 10 cpd. Commended pt for accomplishments thus far. New goal is to limit herself to 8-10 cpd and stand while smoking. Completion of TCRS (Tobacco Cessation Rating Scale) reviewed strategies, cues, and triggers. Introduced the negative impact of tobacco on health, the health advantages of discontinuing the use of tobacco, time line improved health changes after a quit, withdrawal issues to expect from nicotine and habit, and ways to achieve the goal of a quit. Ms. Swann expresses to me that she feels happy about her progress and relieved that she was able to get herself down to 10 cpd. Encouraged pt to remain positive and to focus on smaller goals. FU in 2 weeks.    Diagnosis: F17.200    Next Visit: 2 weeks

## 2024-12-02 ENCOUNTER — PATIENT MESSAGE (OUTPATIENT)
Dept: ADMINISTRATIVE | Facility: OTHER | Age: 54
End: 2024-12-02
Payer: MEDICAID

## 2024-12-08 DIAGNOSIS — F17.200 NICOTINE DEPENDENCE: ICD-10-CM

## 2024-12-08 RX ORDER — VARENICLINE TARTRATE 0.5 (11)-1
KIT ORAL 2 TIMES DAILY
Qty: 53 EACH | Refills: 0 | Status: CANCELLED | OUTPATIENT
Start: 2024-12-08

## 2024-12-09 DIAGNOSIS — F17.200 NICOTINE DEPENDENCE: Primary | ICD-10-CM

## 2024-12-09 RX ORDER — VARENICLINE TARTRATE 1 MG/1
1 TABLET, FILM COATED ORAL 2 TIMES DAILY
Qty: 56 TABLET | Refills: 0 | Status: SHIPPED | OUTPATIENT
Start: 2024-12-09

## 2024-12-11 ENCOUNTER — CLINICAL SUPPORT (OUTPATIENT)
Dept: SMOKING CESSATION | Facility: CLINIC | Age: 54
End: 2024-12-11
Payer: COMMERCIAL

## 2024-12-11 DIAGNOSIS — F17.200 NICOTINE DEPENDENCE: Primary | ICD-10-CM

## 2024-12-11 PROCEDURE — 99999 PR PBB SHADOW E&M-EST. PATIENT-LVL I: CPT | Mod: PBBFAC,,,

## 2024-12-11 PROCEDURE — 99403 PREV MED CNSL INDIV APPRX 45: CPT | Mod: S$GLB,,,

## 2024-12-11 NOTE — PROGRESS NOTES
Individual Follow-Up Form    12/11/2024    Quit Date: TBD    Clinical Status of Patient: Outpatient    Length of Service: 45 minutes    Continuing Medication: yes  Chantix    Other Medications: Wellbutrin     Target Symptoms: Withdrawal and medication side effects. The following were  rated moderate (3) to severe (4) on TCRS:  Moderate (3): desire/crave tobacco  Severe (4): none    Comments: Patient seen in clinic regarding follow up for smoking cessation. Ms. Swann continues to keep track of amount smoked each day. She has consistently decreased to 6-8 cpd. Commended pt for ongoing efforts. New goal is to decrease to 5-6 cpd. Pt continues1 mg Chantix BID with no adverse effects, low moods, or abnormal changes in behaviors noted at this time. completion of TCRS (Tobacco Cessation Rating Scale) reviewed strategies, controlling environment, cues, triggers, new goals set. Introduced high risk situations with preparation interventions, caffeine similarities with withdrawal issues of habit and nicotine, understanding urges, cravings, stress and relaxation. Open discussion with intervention discussion.  Pt's exhaled carbon monoxide level was 6 ppm as per Smokerlyzer. (non- smoker = 0-5 ppm.) Specific goal is to implement wait times in the morning and following meals. Pt also encouraged to practice driving without smoking. Counseling follow up in 4 weeks.     Diagnosis: F17.200    Next Visit: 4 weeks

## 2025-01-07 ENCOUNTER — CLINICAL SUPPORT (OUTPATIENT)
Dept: SMOKING CESSATION | Facility: CLINIC | Age: 55
End: 2025-01-07
Payer: COMMERCIAL

## 2025-01-07 DIAGNOSIS — F17.200 NICOTINE DEPENDENCE: Primary | ICD-10-CM

## 2025-01-07 PROCEDURE — 99403 PREV MED CNSL INDIV APPRX 45: CPT | Mod: S$GLB,,,

## 2025-01-07 PROCEDURE — 99999 PR PBB SHADOW E&M-EST. PATIENT-LVL II: CPT | Mod: PBBFAC,,,

## 2025-01-07 RX ORDER — VARENICLINE TARTRATE 1 MG/1
1 TABLET, FILM COATED ORAL 2 TIMES DAILY
Qty: 56 TABLET | Refills: 0 | Status: SHIPPED | OUTPATIENT
Start: 2025-01-07

## 2025-01-07 NOTE — PROGRESS NOTES
Individual Follow-Up Form    1/7/2025    Quit Date: 1/1/25    Clinical Status of Patient: Outpatient    Length of Service: 45 minutes    Continuing Medication: yes  Chantix    Other Medications: none     Target Symptoms: Withdrawal and medication side effects. The following were  rated moderate (3) to severe (4) on TCRS:  Moderate (3): desire/crave tobacco  Severe (4): none    Comments: Patient seen in clinic regarding smoking cessation follow up. Ms. Swann reports that she quit smoking on 1/1/25. Congratulated pt for meeting her goal. Pt continues1 mg Chantix BID with no adverse effects, low moods, or abnormal changes in behaviors noted at this time. Completion of TCRS (Tobacco Cessation Rating Scale) reviewed strategies, habitual behavior, stress, and high risk situations. Introduced stress with addition interventions, relaxation with interventions, nutrition, exercise, weight gain, and the importance of rewarding oneself for accomplishments toward becoming tobacco free. Open discussion of all items with interventions. Pt's exhaled carbon monoxide level was 2 ppm as per Smokerlyzer. (non- smoker = 0-5 ppm.) Encouraged pt to remain positive and to remind herself that it gets easier with time. FU scheduled in 2 weeks.    Diagnosis: F17.200    Next Visit: 2 weeks

## 2025-01-13 PROBLEM — J47.9 BRONCHIECTASIS WITHOUT COMPLICATION: Status: ACTIVE | Noted: 2025-01-13

## 2025-01-14 ENCOUNTER — CLINICAL SUPPORT (OUTPATIENT)
Dept: REHABILITATION | Facility: HOSPITAL | Age: 55
End: 2025-01-14
Payer: MEDICAID

## 2025-01-14 DIAGNOSIS — M25.511 ACUTE PAIN OF RIGHT SHOULDER: Primary | ICD-10-CM

## 2025-01-14 DIAGNOSIS — M25.612 IMPAIRED RANGE OF MOTION OF LEFT SHOULDER: ICD-10-CM

## 2025-01-14 DIAGNOSIS — M25.511 RIGHT SHOULDER PAIN, UNSPECIFIED CHRONICITY: ICD-10-CM

## 2025-01-14 DIAGNOSIS — R29.898 IMPAIRED STRENGTH OF SHOULDER MUSCLES: ICD-10-CM

## 2025-01-14 PROCEDURE — 97165 OT EVAL LOW COMPLEX 30 MIN: CPT | Mod: PN

## 2025-01-14 PROCEDURE — 97530 THERAPEUTIC ACTIVITIES: CPT | Mod: PN

## 2025-01-14 NOTE — PLAN OF CARE
[facility]  Occupational Therapy Initial Evaluation    Date: 1/14/2025  Name: Hue Hernandez  Clinic Number: [id number]    Therapy Diagnosis:   Encounter Diagnoses   Name Primary?    Right shoulder pain, unspecified chronicity     Acute pain of right shoulder Yes    Impaired strength of shoulder muscles     Impaired range of motion of left shoulder      Physician: Parveen Muhammad II,*    Physician Orders: OT Evaluate and treat   Medical Diagnosis: Shoulder pain   Surgical Procedure and Date: NA  Evaluation Date: 1/14/2025  Insurance Authorization Period Expiration: 1/2/2025 - 1/2/2026  Plan of Care Certification Period: 1/13/2025 - 3/13/2025   Progress Note Due: 2/13/2025   Date of Return to MD: Unmanan    Visit # / Visits authorized: Evaluation  FOTO: 1/3    Time In:9:50  Time Out: 10:30  Total Appointment Time (timed & untimed codes): 40 minutes    SUBJECTIVE     Date of Onset: ~ 1-2 months ago.    History of Current Condition/Mechanism of Injury: Hue reports: Pt reports pain slowly progressing over time with it eventually causing her pain at night with difficulty sleeping. She states it has since been interfering with her ADL. Pt reporting to MD with x-ray taken showing no abnormalities and referred to OT for shoulder pain .    Falls: None    Involved Side: right   Dominant Side: Right  Imaging: x-ray 12/16/2024  Prior Therapy: None  Occupation: NA  Working presently: disability    Functional Limitations/Social History:    Previous functional status includes: Independent with all ADLs.     Current Functional Status   Home/Living environment: lives with their daughter      Limitation of Functional Status as follows:   ADLs/IADLs:     - Feeding: Ind    - Bathing: MOD I (back and overhead)    - Dressing/Grooming: MOD I    - Driving: Ind    Pain:  Functional Pain Scale Rating 0-10:     Rest 4/10,   With movement 8/10,   worst 8/10     Location: Anterior shoulder near biceps  tendon  Description: Sharp  Aggravating Factors: Sleeping on it, lifting, reaching  Easing Factors: rest    Medical History:   Past Medical History:   Diagnosis Date    Anticoagulant long-term use     Arthritis     Back pain     Carpal tunnel syndrome     left    COPD (chronic obstructive pulmonary disease)     Cubital tunnel syndrome     left    Cubital tunnel syndrome on right     Depression     Diabetes mellitus     Diabetes mellitus, type 2     Diabetic peripheral neuropathy     Encounter for blood transfusion     GERD (gastroesophageal reflux disease)     Glaucoma suspect     Glaucoma suspect of both eyes     High cholesterol     Hip pain     History of appendectomy     Hyperlipemia     Hypertension     Muscle spasms of both lower extremities     Neck pain     Neuropathy     Obesity     KENTRELL on CPAP     Radiation thyroiditis     Sensory peripheral neuropathy     Stroke      tia 3-4 years ago - states has had 2 strokes - problems with decision making    Tardive dyskinesia 2020       Surgical History:    has a past surgical history that includes Cholecystectomy; Appendectomy; Eye surgery; Carpal tunnel release (Right);  section (, ); Dilation and curettage of uterus (); Tubal ligation (); Carpal tunnel release (Left, 10/18/2018); Ulnar tunnel release (Left, 10/18/2018); Colonoscopy (N/A, 2023); and Carpal tunnel release (Right, 10/31/2023).    Medications:   has a current medication list which includes the following prescription(s): albuterol, albuterol, aspirin, atorvastatin, baqsimi, bd ultra-fine short pen needle, fasenra pen, fasenra pen, blood sugar diagnostic, blood-glucose meter, dexcom g7 sensor, bupropion, buspirone, vraylar, cranberry, cyanocobalamin, empagliflozin, ezetimibe, fluticasone propionate, trelegy ellipta, hydrocodone-acetaminophen, hydroxyzine hcl, insulin aspart u-100, omnipod 5 g6-g7 pods (gen 5), levothyroxine, liothyronine, liothyronine, magnesium  (oxide/aa chelate), metformin, metoprolol succinate, mirtazapine, omnipod 5 g6 intro kit (gen 5), polyethylene glycol, pregabalin, senna, mounjaro, varenicline, and vitamin d.    Allergies:   Review of patient's allergies indicates:   Allergen Reactions    Latex, natural rubber Other (See Comments)     Latex condoms burning pain.      Tiotropium bromide Other (See Comments)     Oral irritation/thrush    Iodine      Blisters (mouth)^  Blisters (mouth)^    Adhesive tape-silicones Rash     Use paper tape  If necessary          OBJECTIVE     Scapular function and position: Reduced elevation and retraction       Special Tests   Right Left   Molina Barrie Pos    Neer's Neg    Lift Off Pos    Belly Press     Perdomo Neg    Speed's Pos    Empty/Full Can Neg    Drop Arm     Painful Arc Pos    Hornblowers Neg            Active Range of Motion:  (Measured in degrees)   Right Left   Shld Flexion 120 160   Shld Extension 50 70   Shld Abduction 105 170   Shld Adduction 0 0   Shld Ext Rotation 65 85   Shld Int Rotation 50 70       Passive Range of Motion:  (Measured in degrees)   Right   Shld Flexion 110*   Shld Extension 50   Shld Abduction 120*   Shld Adduction 0   Shld Ext Rotation 40*   Shld Int Rotation 35   *Pain    Manual Muscle Test   Right Left   Shld Flexion 3-/5 wnl   Shld Extension 4/5    Shld Abduction 3-/5    Shld Adduction 4/5    Shld Ext Rotation 4-/5    Shld Int Rotation 3+/5    Elbow Flexion 5/5    Elbow Extension 5/5    Elbow Supination 5/5    Elbow Pronation 5/5         Strength (Dynamometer/Measured in pounds on Rung II) and Pinch Strength (Pinch Gauge)   1/14/2025 1/14/2025    Right Left   Composite 35 55   Lateral Pinch 16 15   Tripod Pinch 13 13       Opposition (Fine Motor Skills/Dexterity): wnl     Wound/Scar management: wnl    Sensation: wnl    Edema: wnl        Limitation/Restriction for FOTO Survey    Therapist reviewed FOTO scores for Hue Hernandez on 1/14/2025.   FOTO documents entered  into [facility] - see Media section.    Limitation Score: See media section         Treatment   Total Treatment time (time-based codes) separate from Evaluation: 10 minutes    Hue received the treatments listed below:     Therapeutic activities to improve functional performance for 10  minutes, including:  Table slides: 20 reps  Active assisted range of motion external rotation: 20 reps  Shoulder shrugs: 20 reps  Shoulder retractions: 20 reps  Yellow band external rotation: 2 x 10    Patient Education and Home Exercises      Education provided:   - HEP    Written Home Exercises Provided: yes.  Exercises were reviewed and Hue was able to demonstrate them prior to the end of the session.  Hue demonstrated good  understanding of the education provided. See EMR under Patient Instructions for exercises provided during therapy sessions.     Pt was advised to perform these exercises free of pain, and to stop performing them if pain occurs.    Patient/Family Education: role of OT, goals for OT, scheduling/cancellations - pt verbalized understanding. Discussed insurance limitations with patient.      ASSESSMENT     Hue Hernandez is a 54 y.o. female referred to outpatient occupational therapy and presents with a medical diagnosis of right shoulder pain.  Patient presents with the following therapy deficits: Decreased ROM, Decreased  strength, Decreased muscle strength, Decreased functional hand use, and Increased pain and demonstrates limitations as described in the chart below. Following medical record review it is determined that pt will benefit from occupational therapy services in order to maximize pain free and/or functional use of right shoulder. The following goals were discussed with the patient and patient is in agreement with them as to be addressed in the treatment plan. The patient's rehab potential is Good.     Anticipated barriers to occupational therapy: None  Pt has no cultural, educational or  language barriers to learning provided.    Profile and History Assessment of Occupational Performance Level of Clinical Decision Making Complexity Score   Occupational Profile:   Hue Hernandez is a 54 y.o. female who lives with their daughter and is on disability Hue Hernandez has difficulty with  ADLs and IADLs as listed previously, which  Affecting herdaily functional abilities.      Comorbidities:    has a past medical history of Anticoagulant long-term use, Arthritis, Back pain, Carpal tunnel syndrome, COPD (chronic obstructive pulmonary disease), Cubital tunnel syndrome, Cubital tunnel syndrome on right, Depression, Diabetes mellitus, Diabetes mellitus, type 2, Diabetic peripheral neuropathy, Encounter for blood transfusion, GERD (gastroesophageal reflux disease), Glaucoma suspect, Glaucoma suspect of both eyes, High cholesterol, Hip pain, History of appendectomy, Hyperlipemia, Hypertension, Muscle spasms of both lower extremities, Neck pain, Neuropathy, Obesity, KENTRELL on CPAP, Radiation thyroiditis, Sensory peripheral neuropathy, Stroke, and Tardive dyskinesia.    Medical and Therapy History Review:   Brief               Performance Deficits    Physical:  Joint Mobility  Muscle Power/Strength   Strength  Pain    Cognitive:  No Deficits    Psychosocial:    No Deficits     Clinical Decision Making:  low    Assessment Process:  Problem-Focused Assessments    Modification/Need for Assistance:  Not Necessary    Intervention Selection:  Limited Treatment Options       low  Based on PMHX, co morbidities , data from assessments and functional level of assistance required with task and clinical presentation directly impacting function.       The following goals were discussed with the patient and patient is in agreement with them as to be addressed in the treatment plan.       Goals:   The following goals were discussed with the patient and patient is in agreement with them as to be addressed in the  treatment plan.     Short Term Goals (STGs); to be met within 4 weeks.  STG #1: Pt will report 5 out of 10 pain level at worst in right upper extremity.  STG #2: Pt will demonstrate increased ROM of 20 degrees flexion and abduction in order increase functional use of UE  STG #3: Pt will increase shoulder MMT by 1 muscle grade in all shoulder planes for improved   STG #4: Pt will demonstrate independence with issued HEP.     Long Term Goals (LTGs); to be met by discharge.  LTG #1: Pt will report a pain level of 2 out of 10 at worst in right upper extremity.    LTG #2: Pt will demo improved FOTO score to predicted level.  LTG #3: Pt will return to prior level of function for ADL.   STG #4: Pt will demonstrate increased ROM WFL in order increase functional use of UE  LTG #5: Pt will increase shoulder MMT within WFL in order to improve functional use of UE.  LTG #6: Pt will demonstrate increased  strength of 20#s in order to increase functional use of UE.  PLAN   Plan of Care Certification: 1/14/2025 to 3/14/2025.     Outpatient Occupational Therapy 1 - 2 times weekly for 6 weeks to include the following interventions: Manual therapy/joint mobilizations, Modalities for pain management, US 3 mhz, Therapeutic exercises/activities., Strengthening, Electrical Modalities, and Joint Protection.      Shahbaz Galiica OT      I CERTIFY THE NEED FOR THESE SERVICES FURNISHED UNDER THIS PLAN OF TREATMENT AND WHILE UNDER MY CARE  Physician's comments:      Physician's Signature: ___________________________________________________

## 2025-01-24 ENCOUNTER — DOCUMENTATION ONLY (OUTPATIENT)
Dept: REHABILITATION | Facility: HOSPITAL | Age: 55
End: 2025-01-24
Payer: MEDICAID

## 2025-01-24 NOTE — PROGRESS NOTES
Pt no call / no show for apt today. Called patient she reported she forgot about appt as well as she could not come in because she has bad tires. Pt was reminded of next appt 1/29/25 at 2:30 PM.     Leonela RODRÍGUEZ

## 2025-01-27 ENCOUNTER — PATIENT MESSAGE (OUTPATIENT)
Dept: ADMINISTRATIVE | Facility: OTHER | Age: 55
End: 2025-01-27
Payer: MEDICAID

## 2025-01-29 ENCOUNTER — CLINICAL SUPPORT (OUTPATIENT)
Dept: REHABILITATION | Facility: HOSPITAL | Age: 55
End: 2025-01-29
Payer: MEDICAID

## 2025-01-29 DIAGNOSIS — M25.511 RIGHT SHOULDER PAIN, UNSPECIFIED CHRONICITY: Primary | ICD-10-CM

## 2025-01-29 DIAGNOSIS — M25.511 ACUTE PAIN OF RIGHT SHOULDER: ICD-10-CM

## 2025-01-29 DIAGNOSIS — R29.898 IMPAIRED STRENGTH OF SHOULDER MUSCLES: ICD-10-CM

## 2025-01-29 PROCEDURE — 97530 THERAPEUTIC ACTIVITIES: CPT | Mod: PN,CO

## 2025-01-29 NOTE — PROGRESS NOTES
"  Occupational Therapy Daily Treatment Note     Date: 1/29/2025  Name: Hue Hernandez  MRN: [id number]    Diagnosis:   Encounter Diagnoses   Name Primary?    Right shoulder pain, unspecified chronicity Yes    Acute pain of right shoulder     Impaired strength of shoulder muscles      Referring Physician: Parveen Muhammad II,*    Physician Orders: OT Evaluate and treat   Medical Diagnosis: Shoulder pain   Surgical Procedure and Date: NA  Evaluation Date: 1/14/2025  Insurance Authorization Period Expiration: 1/2/2025 - 1/2/2026  Plan of Care Certification Period: 1/13/2025 - 3/13/2025   Progress Note Due: 2/13/2025   Date of Return to MD: Dana    Visit # / Visits authorized: 1 / 20  RODRÍGUEZ visit number: 1  Time In: 2:30 PM  Time Out:  3:15 PM  Total Billable Time: 45 minutes    Precautions:  Diabetes, blood thinners, CHF, and LATEX and ADHESIVE ALLERGY      Subjective     Pt reports: "its okay."  she was compliant with home exercise program given last session.     Pain: 6/10  Location: right shoulder      Objective     Treatment consist of the following:     Hue received the following direct contact / supervised modalities after being cleared for contradictions:   -Applied MHP to (R) shoulder with double layer coverage to protect skin integrity to increase muscle elasticity and improve blood flow while completing the following therapeutic interventions:   -Black digi-flex individual 2 x 10   -Black digi-flex composite 3 x 10   -Black clothespin pincer pinch 3 x 10   -Black clothespin tripod pinch 3 x 10   -Black clothespin lateral pinch 3 x 10  Pt tolerated IFC-PREMOD sensory motor to right anterior shoulder as well as supraspinatus to relieve pain up to 8 hours via multiple segmental and systemic pain modulation mechanisms.      Hue received the following manual therapy techniques:   -none today    Hue participated in dynamic functional therapeutic activities, therapeutic exercise, and neuromuscular " re-education Kinesthetic to improve functional performance, including:  -Scapular retractions 2 x 15  -Scapular elevations 2 x 15  -red theraband bicep curls 2 x 10  -red theraband tricep extensions 2 x 10  -Finger ladder x 10  -Wall slides in shoulder flexion 2 x 10     Home Exercises and Education Provided     Education provided:   - Roles and goals of Occupational therapy  - Occupational therapy plan of care  - Progress towards goals     Written Home Exercises Provided: Patient instructed to cont prior HEP.  Exercises were reviewed and Hue was able to demonstrate them prior to the end of the session.  Hue demonstrated good  understanding of the HEP provided.   .   See EMR under Patient Instructions for exercises provided prior visit.        Assessment     Pt would continue to benefit from skilled OT. Pt tolerated session well with minimal reports of increase in pain. Pt reported estim helped a lot with pain.  Occupational therapist /RODRÍGUEZ collaboration to discuss POC, improvements, and d/c planning on todays date.      Hue is progressing well towards her goals and there are no updates to goals at this time. Pt prognosis is Good.     Pt will continue to benefit from skilled OT intervention.    Patient continues to demonstrate limitation  with  ROM, Joint mobility, Stiffness, Decreased gross motor coordination, Decreased functional use, Impaired sensation, Decreased strength, Continued pain, and Continued inflammation     Goals:  The following goals were discussed with the patient and patient is in agreement with them as to be addressed in the treatment plan.      Short Term Goals (STGs); to be met within 4 weeks.  STG #1: Pt will report 5 out of 10 pain level at worst in right upper extremity.  STG #2: Pt will demonstrate increased ROM of 20 degrees flexion and abduction in order increase functional use of UE  STG #3: Pt will increase shoulder MMT by 1 muscle grade in all shoulder planes for improved   STG #4:  Pt will demonstrate independence with issued HEP.      Long Term Goals (LTGs); to be met by discharge.  LTG #1: Pt will report a pain level of 2 out of 10 at worst in right upper extremity.     LTG #2: Pt will demo improved FOTO score to predicted level.  LTG #3: Pt will return to prior level of function for ADL.   STG #4: Pt will demonstrate increased ROM WFL in order increase functional use of UE  LTG #5: Pt will increase shoulder MMT within WFL in order to improve functional use of UE.  LTG #6: Pt will demonstrate increased  strength of 20#s in order to increase functional use of UE.    Plan   Plan of Care Certification: 1/14/2025 to 3/14/2025.      Outpatient Occupational Therapy 1 - 2 times weekly for 6 weeks to include the following interventions: Manual therapy/joint mobilizations, Modalities for pain management, US 3 mhz, Therapeutic exercises/activities., Strengthening, Electrical Modalities, and Joint Protection.    Updates/Grading for next session: N/A      MARCOS Aguilar/TEOFILO

## 2025-01-31 ENCOUNTER — CLINICAL SUPPORT (OUTPATIENT)
Dept: REHABILITATION | Facility: HOSPITAL | Age: 55
End: 2025-01-31
Payer: MEDICAID

## 2025-01-31 DIAGNOSIS — M25.511 ACUTE PAIN OF RIGHT SHOULDER: ICD-10-CM

## 2025-01-31 DIAGNOSIS — R29.898 IMPAIRED STRENGTH OF SHOULDER MUSCLES: ICD-10-CM

## 2025-01-31 DIAGNOSIS — M25.612 IMPAIRED RANGE OF MOTION OF LEFT SHOULDER: ICD-10-CM

## 2025-01-31 DIAGNOSIS — M25.511 RIGHT SHOULDER PAIN, UNSPECIFIED CHRONICITY: Primary | ICD-10-CM

## 2025-01-31 PROCEDURE — 97530 THERAPEUTIC ACTIVITIES: CPT | Mod: PN,CO

## 2025-01-31 NOTE — PROGRESS NOTES
"  Occupational Therapy Daily Treatment Note     Date: 1/31/2025  Name: Hue Hernandez  MRN: [id number]    Diagnosis:   Encounter Diagnoses   Name Primary?    Right shoulder pain, unspecified chronicity Yes    Acute pain of right shoulder     Impaired strength of shoulder muscles     Impaired range of motion of left shoulder      Referring Physician: Parveen Muhammad II,*    Physician Orders: OT Evaluate and treat   Medical Diagnosis: Shoulder pain   Surgical Procedure and Date: NA  Evaluation Date: 1/14/2025  Insurance Authorization Period Expiration: 1/2/2025 - 1/2/2026  Plan of Care Certification Period: 1/13/2025 - 3/13/2025   Progress Note Due: 2/13/2025   Date of Return to MD: Dana    Visit # / Visits authorized: 2 / 20  RODRÍGUEZ visit number: 2  Time In: 1:30 PM  Time Out:  2:15 PM  Total Billable Time: 45 minutes    Precautions:  Diabetes, blood thinners, CHF, and LATEX and ADHESIVE ALLERGY      Subjective     Pt reports: "its hurting."  she was compliant with home exercise program given last session.     Pain: 7/10  Location: right shoulder      Objective     Treatment consist of the following:     Hue received the following direct contact / supervised modalities after being cleared for contradictions:   -Applied MHP to (R) shoulder with double layer coverage to protect skin integrity to increase muscle elasticity and improve blood flow while completing the following therapeutic interventions:   -Black digi-flex individual 2 x 10   -Black digi-flex composite 3 x 10   -Black clothespin pincer pinch 3 x 10   -Black clothespin tripod pinch 3 x 10   -Black clothespin lateral pinch 3 x 10  Pt tolerated IFC-PREMOD sensory motor to right anterior shoulder as well as supraspinatus to relieve pain up to 8 hours via multiple segmental and systemic pain modulation mechanisms.      Hue received the following manual therapy techniques:   -none today    Hue participated in dynamic functional therapeutic " activities, therapeutic exercise, and neuromuscular re-education Kinesthetic to improve functional performance, including:  -Scapular retractions 2 x 15  -Scapular elevations 2 x 15  -red theraband bicep curls 2 x 10  -red theraband tricep extensions 2 x 10  -Finger ladder x 10 (Deferred due to pain)  -Wall slides in shoulder flexion 2 x 10 (Deferred due to pain)  -UBE x 3 min forward x 3 min backwards    Home Exercises and Education Provided     Education provided:   - Roles and goals of Occupational therapy  - Occupational therapy plan of care  - Progress towards goals     Written Home Exercises Provided: Patient instructed to cont prior HEP.  Exercises were reviewed and Hue was able to demonstrate them prior to the end of the session.  Hue demonstrated good  understanding of the HEP provided.   .   See EMR under Patient Instructions for exercises provided prior visit.        Assessment     Pt would continue to benefit from skilled OT. Pt arrived today with increase in pain since last session. Pt reported she slept on it and could be causing pain. Pt tolerated session well with moderate reports of increase in pain. Pt reported estim helped a lot with pain.  Occupational therapist /RODRÍGUEZ collaboration to discuss POC, improvements, and d/c planning on todays date.      Hue is progressing well towards her goals and there are no updates to goals at this time. Pt prognosis is Good.     Pt will continue to benefit from skilled OT intervention.    Patient continues to demonstrate limitation  with  ROM, Joint mobility, Stiffness, Decreased gross motor coordination, Decreased functional use, Impaired sensation, Decreased strength, Continued pain, and Continued inflammation     Goals:  The following goals were discussed with the patient and patient is in agreement with them as to be addressed in the treatment plan.      Short Term Goals (STGs); to be met within 4 weeks.  STG #1: Pt will report 5 out of 10 pain level at  worst in right upper extremity.  STG #2: Pt will demonstrate increased ROM of 20 degrees flexion and abduction in order increase functional use of UE  STG #3: Pt will increase shoulder MMT by 1 muscle grade in all shoulder planes for improved   STG #4: Pt will demonstrate independence with issued HEP.      Long Term Goals (LTGs); to be met by discharge.  LTG #1: Pt will report a pain level of 2 out of 10 at worst in right upper extremity.     LTG #2: Pt will demo improved FOTO score to predicted level.  LTG #3: Pt will return to prior level of function for ADL.   STG #4: Pt will demonstrate increased ROM WFL in order increase functional use of UE  LTG #5: Pt will increase shoulder MMT within WFL in order to improve functional use of UE.  LTG #6: Pt will demonstrate increased  strength of 20#s in order to increase functional use of UE.    Plan   Plan of Care Certification: 1/14/2025 to 3/14/2025.      Outpatient Occupational Therapy 1 - 2 times weekly for 6 weeks to include the following interventions: Manual therapy/joint mobilizations, Modalities for pain management, US 3 mhz, Therapeutic exercises/activities., Strengthening, Electrical Modalities, and Joint Protection.    Updates/Grading for next session: N/A      MARCOS Aguilar/TEOFILO

## 2025-02-03 ENCOUNTER — CLINICAL SUPPORT (OUTPATIENT)
Dept: REHABILITATION | Facility: HOSPITAL | Age: 55
End: 2025-02-03
Payer: MEDICAID

## 2025-02-03 DIAGNOSIS — M25.612 IMPAIRED RANGE OF MOTION OF LEFT SHOULDER: ICD-10-CM

## 2025-02-03 DIAGNOSIS — M25.511 ACUTE PAIN OF RIGHT SHOULDER: Primary | ICD-10-CM

## 2025-02-03 DIAGNOSIS — R29.898 IMPAIRED STRENGTH OF SHOULDER MUSCLES: ICD-10-CM

## 2025-02-03 PROCEDURE — 97530 THERAPEUTIC ACTIVITIES: CPT | Mod: PN

## 2025-02-03 NOTE — PROGRESS NOTES
"  Occupational Therapy Daily Treatment Note     Date: 2/3/2025  Name: Hue Hernandez  MRN: [id number]    Diagnosis:   Encounter Diagnoses   Name Primary?    Acute pain of right shoulder Yes    Impaired strength of shoulder muscles     Impaired range of motion of left shoulder        Referring Physician: Parveen Muhammad II,*    Physician Orders: OT Evaluate and treat   Medical Diagnosis: Shoulder pain   Surgical Procedure and Date: NA  Evaluation Date: 1/14/2025  Insurance Authorization Period Expiration: 1/2/2025 - 1/2/2026  Plan of Care Certification Period: 1/13/2025 - 3/13/2025   Progress Note Due: 2/13/2025   Date of Return to MD: Dana    Visit # / Visits authorized: 3 / 20  RODRÍGUEZ visit number: 0  Time In: 3:15 PM  Time Out:  3:53 PM  Total Billable Time: 33 minutes    Precautions:  Diabetes, blood thinners, CHF, and LATEX and ADHESIVE ALLERGY      Subjective     Pt reports: "its hurting."  she was compliant with home exercise program given last session.     Pain: 6/10  Location: right shoulder      Objective     Treatment consist of the following:     Hue received the following direct contact / supervised modalities for 10 minutes after being cleared for contradictions:   -Applied MHP to (R) shoulder with double layer coverage to protect skin integrity to increase muscle elasticity and improve blood flow while completing the following therapeutic interventions:   -Black digi-flex individual 2 x 10   -Black digi-flex composite 3 x 10   -Black clothespin pincer pinch 3 x 10   -Black clothespin tripod pinch 3 x 10   -Black clothespin lateral pinch 3 x 10  Pt tolerated IFC-PREMOD sensory motor to right anterior shoulder as well as supraspinatus to relieve pain up to 8 hours via multiple segmental and systemic pain modulation mechanisms.      Hue received the following manual therapy techniques:   -none today    Hue participated in dynamic functional therapeutic activities, therapeutic exercise, and " neuromuscular re-education Kinesthetic to improve functional performance for 23 minutes including:  Table slides: 2 x 10  Wall climb: 10  Shrugs: 2 x 10  Retractions: 2 x 10  Shoulder isometrics 2 x 10 each   Flexion   Extension   Abduction    Adduction   External rotation   Internal rotation   Biceps curls: 3 x 10 3#  Triceps extension: 3 x 10 red band    Cold Pack - Applied to right shoulder to reduce post session inflammation and pain following therapy for 5 minutes.  Home Exercises and Education Provided     Education provided:   - Roles and goals of Occupational therapy  - Occupational therapy plan of care  - Progress towards goals     Written Home Exercises Provided: Patient instructed to cont prior HEP.  Exercises were reviewed and Hue was able to demonstrate them prior to the end of the session.  Hue demonstrated good  understanding of the HEP provided.   .   See EMR under Patient Instructions for exercises provided prior visit.        Assessment     Pt would continue to benefit from skilled OT.     Hue is progressing well towards her goals and there are no updates to goals at this time. Pt prognosis is Good.     Pt will continue to benefit from skilled OT intervention.    Patient continues to demonstrate limitation  with  ROM, Joint mobility, Stiffness, Decreased gross motor coordination, Decreased functional use, Impaired sensation, Decreased strength, Continued pain, and Continued inflammation     Goals:  The following goals were discussed with the patient and patient is in agreement with them as to be addressed in the treatment plan.      Short Term Goals (STGs); to be met within 4 weeks.  STG #1: Pt will report 5 out of 10 pain level at worst in right upper extremity.  STG #2: Pt will demonstrate increased ROM of 20 degrees flexion and abduction in order increase functional use of UE  STG #3: Pt will increase shoulder MMT by 1 muscle grade in all shoulder planes for improved   STG #4: Pt will  demonstrate independence with issued HEP.      Long Term Goals (LTGs); to be met by discharge.  LTG #1: Pt will report a pain level of 2 out of 10 at worst in right upper extremity.     LTG #2: Pt will demo improved FOTO score to predicted level.  LTG #3: Pt will return to prior level of function for ADL.   STG #4: Pt will demonstrate increased ROM WFL in order increase functional use of UE  LTG #5: Pt will increase shoulder MMT within WFL in order to improve functional use of UE.  LTG #6: Pt will demonstrate increased  strength of 20#s in order to increase functional use of UE.    Plan   Plan of Care Certification: 1/14/2025 to 3/14/2025.      Outpatient Occupational Therapy 1 - 2 times weekly for 6 weeks to include the following interventions: Manual therapy/joint mobilizations, Modalities for pain management, US 3 mhz, Therapeutic exercises/activities., Strengthening, Electrical Modalities, and Joint Protection.    Updates/Grading for next session: N/A      Shahbaz Galicia OT

## 2025-02-05 ENCOUNTER — CLINICAL SUPPORT (OUTPATIENT)
Dept: REHABILITATION | Facility: HOSPITAL | Age: 55
End: 2025-02-05
Payer: MEDICAID

## 2025-02-05 ENCOUNTER — CLINICAL SUPPORT (OUTPATIENT)
Dept: SMOKING CESSATION | Facility: CLINIC | Age: 55
End: 2025-02-05
Payer: COMMERCIAL

## 2025-02-05 DIAGNOSIS — F17.200 NICOTINE DEPENDENCE: ICD-10-CM

## 2025-02-05 DIAGNOSIS — M25.511 ACUTE PAIN OF RIGHT SHOULDER: Primary | ICD-10-CM

## 2025-02-05 DIAGNOSIS — R29.898 IMPAIRED STRENGTH OF SHOULDER MUSCLES: ICD-10-CM

## 2025-02-05 PROCEDURE — 99404 PREV MED CNSL INDIV APPRX 60: CPT | Mod: S$GLB,,,

## 2025-02-05 PROCEDURE — 97530 THERAPEUTIC ACTIVITIES: CPT | Mod: PN

## 2025-02-05 PROCEDURE — 99999 PR PBB SHADOW E&M-EST. PATIENT-LVL II: CPT | Mod: PBBFAC,,,

## 2025-02-05 RX ORDER — VARENICLINE TARTRATE 1 MG/1
1 TABLET, FILM COATED ORAL DAILY
Qty: 30 TABLET | Refills: 0 | Status: SHIPPED | OUTPATIENT
Start: 2025-02-05 | End: 2025-02-26 | Stop reason: ALTCHOICE

## 2025-02-05 NOTE — PROGRESS NOTES
"  Occupational Therapy Daily Treatment Note     Date: 2/5/2025  Name: Hue Hernandez  MRN: [id number]    Diagnosis:   Encounter Diagnoses   Name Primary?    Acute pain of right shoulder Yes    Impaired strength of shoulder muscles          Referring Physician: Parveen Muhammad II,*    Physician Orders: OT Evaluate and treat   Medical Diagnosis: Shoulder pain   Surgical Procedure and Date: NA  Evaluation Date: 1/14/2025  Insurance Authorization Period Expiration: 1/2/2025 - 1/2/2026  Plan of Care Certification Period: 1/13/2025 - 3/13/2025   Progress Note Due: 2/13/2025   Date of Return to MD: Dana    Visit # / Visits authorized: 3 / 20  RODRÍGUEZ visit number: 0  Time In: 2:25 PM  Time Out:  3:08 PM  Total Billable Time: 38 minutes    Precautions:  Diabetes, blood thinners, CHF, and LATEX and ADHESIVE ALLERGY      Subjective     Pt reports: "My shoulder, elbow, hands, everything is hurting today."  she was compliant with home exercise program given last session.     Pain: 6/10  Location: right shoulder      Objective     Treatment consist of the following:     Hue received the following direct contact / supervised modalities for 10 minutes after being cleared for contradictions:   Pt received the following ultrasound modalities after being cleared for contraindication for 8 minutes (with setup):  Ultrasound  3.3MHz with 1.0 Wcm2 100%dutycycle  x 8 minutes with prep  (R) shoulder region to anterior medial region to  promote improved muscle circulation, elasticity, relaxation, and reduce spasm.    Hue received the following manual therapy techniques:   -none today    Hue participated in dynamic functional therapeutic activities, therapeutic exercise, and neuromuscular re-education Kinesthetic to improve functional performance for 23 minutes including:  Supine Active assisted range of motion including:   Flexion: 3 x 10   Abduction: 3 x 10   External rotation: 3 x 10  Supine short lever abduction: 2 x 10 yellow " band  Forward punches: 2 x 10  Extension: 2 x 10 yellow band  Wall climbs: 20 reps  Shrugs: 30 reps  Retractions: 30 reps     Pt received the following ultrasound modalities after being cleared for contraindication for 5 minutes (with setup):  Ultrasound  3.3MHz with 1.0 Wcm2 100%dutycycle  x 5 minutes with prep  (R) shoulder region to anterior medial region to  promote improved muscle circulation, elasticity, relaxation, and reduce spasm as well as post session inflammation.      Home Exercises and Education Provided     Education provided:   - Roles and goals of Occupational therapy  - Occupational therapy plan of care  - Progress towards goals     Written Home Exercises Provided: Patient instructed to cont prior HEP.  Exercises were reviewed and Hue was able to demonstrate them prior to the end of the session.  Hue demonstrated good  understanding of the HEP provided.   .   See EMR under Patient Instructions for exercises provided prior visit.        Assessment     Pt would continue to benefit from skilled OT. Pt reporting continued pain to anterior shoulder today. She did report good tolerance and reduced pain to shoulder following ultrasound. Pain reported primarily with Active assisted range of motion abduction and active extension today.     Hue is progressing well towards her goals and there are no updates to goals at this time. Pt prognosis is Good.     Pt will continue to benefit from skilled OT intervention.    Patient continues to demonstrate limitation  with  ROM, Joint mobility, Stiffness, Decreased gross motor coordination, Decreased functional use, Impaired sensation, Decreased strength, Continued pain, and Continued inflammation     Goals:  The following goals were discussed with the patient and patient is in agreement with them as to be addressed in the treatment plan.      Short Term Goals (STGs); to be met within 4 weeks.  STG #1: Pt will report 5 out of 10 pain level at worst in right upper  extremity.  STG #2: Pt will demonstrate increased ROM of 20 degrees flexion and abduction in order increase functional use of UE  STG #3: Pt will increase shoulder MMT by 1 muscle grade in all shoulder planes for improved   STG #4: Pt will demonstrate independence with issued HEP.      Long Term Goals (LTGs); to be met by discharge.  LTG #1: Pt will report a pain level of 2 out of 10 at worst in right upper extremity.     LTG #2: Pt will demo improved FOTO score to predicted level.  LTG #3: Pt will return to prior level of function for ADL.   STG #4: Pt will demonstrate increased ROM WFL in order increase functional use of UE  LTG #5: Pt will increase shoulder MMT within WFL in order to improve functional use of UE.  LTG #6: Pt will demonstrate increased  strength of 20#s in order to increase functional use of UE.    Plan   Plan of Care Certification: 1/14/2025 to 3/14/2025.      Outpatient Occupational Therapy 1 - 2 times weekly for 6 weeks to include the following interventions: Manual therapy/joint mobilizations, Modalities for pain management, US 3 mhz, Therapeutic exercises/activities., Strengthening, Electrical Modalities, and Joint Protection.    Updates/Grading for next session: N/A      Shahbaz Galicia OT

## 2025-02-05 NOTE — PROGRESS NOTES
Individual Follow-Up Form    2/5/2025    Quit Date: 1/1/25    Clinical Status of Patient: Outpatient    Length of Service: 60 minutes    Continuing Medication: yes  Chantix    Other Medications: none     Target Symptoms: Withdrawal and medication side effects. The following were  rated moderate (3) to severe (4) on TCRS:  Moderate (3): desire/crave tobacco  Severe (4): none    Comments: Smoking cessation follow up. Congratulated Ms. Swann for being tobacco free for 1 month. She continues to crave tobacco, and we have reviewed health coping strategies. Pt continues 1 mg Chantix BID with no adverse effects, low moods, or abnormal changes in behaviors noted at this time. Pt to begin taking chantix QD with goal to wean off. Completion of TCRS (Tobacco Cessation Rating Scale) reviewed strategies, habitual behavior, high risks situations, understanding urges and cravings, stress and relaxation with open discussion and additional interventions, Introduced lapses, relapses, understanding them and analyzing the situation of a lapse, conflict issues that may be linked to a lapse. Pt's exhaled carbon monoxide level was 2 ppm as per [employer]. (non- smoker = 0-5 ppm.)  Pt to continue with counseling in 4 weeks.     Diagnosis: F17.200    Next Visit: 3 weeks

## 2025-02-10 ENCOUNTER — CLINICAL SUPPORT (OUTPATIENT)
Dept: REHABILITATION | Facility: HOSPITAL | Age: 55
End: 2025-02-10
Payer: MEDICAID

## 2025-02-10 DIAGNOSIS — M25.511 ACUTE PAIN OF RIGHT SHOULDER: Primary | ICD-10-CM

## 2025-02-10 PROCEDURE — 97530 THERAPEUTIC ACTIVITIES: CPT | Mod: PN

## 2025-02-10 NOTE — PROGRESS NOTES
"  Occupational Therapy Daily Treatment Note     Date: 2/10/2025  Name: Hue Hernandez  MRN: [id number]    Diagnosis:   No diagnosis found.        Referring Physician: Parveen Muhammad II,*    Physician Orders: OT Evaluate and treat   Medical Diagnosis: Shoulder pain   Surgical Procedure and Date: NA  Evaluation Date: 1/14/2025  Insurance Authorization Period Expiration: 1/2/2025 - 1/2/2026  Plan of Care Certification Period: 1/13/2025 - 3/13/2025   Progress Note Due: 2/13/2025   Date of Return to MD: Dana    Visit # / Visits authorized:   4 / 20  RODRÍGUEZ visit number: 0  Time In:   10:45 am  Time Out:  11:30 am  Total Billable Time: 45 minutes    Precautions:  Diabetes, blood thinners, CHF, and LATEX and ADHESIVE ALLERGY      Subjective     Pt reports: "It still hurting bad and keeping me up at night."  she was compliant with home exercise program given last session.     Pain: 6/10  Location: right shoulder      Objective     Treatment consist of the following:     Hue received the following direct contact / supervised modalities for 16 minutes after being cleared for contradictions:    -    - Ultrasound  3.3MHz with 1.0 Wcm2 100%dutycycle  x 5 minutes with prep  (R) shoulder region to anterior medial region to  promote improved muscle circulation, elasticity, relaxation, and reduce spasm.   - MHP x 8 min to (R) shoulder x 8 min to reduce pain and inflammation prior to session     Hue participated in dynamic functional therapeutic activities, therapeutic exercise, and neuromuscular re-education Kinesthetic to improve functional performance for 29 minutes including:  Supine Active assisted range of motion including: (deferred due to increased pain)   Flexion: 3 x 10   Abduction: 3 x 10   External rotation: 3 x 10  Supine short lever abduction: 2 x 10 yellow band (deferred)  Forward punches: 2 x 10  Extension: 2 x 10 yellow band  Wall climbs: 20 reps  Shrugs: 30 reps  Retractions: 30 reps   Bicep/tricep red " theraband   UBE x 3 min with fair tolerance and pain  Manual muscle spasm message anterior medial GH and deltoid      Home Exercises and Education Provided     Education provided:   - Roles and goals of Occupational therapy  - Occupational therapy plan of care  - Progress towards goals     Written Home Exercises Provided: Patient instructed to cont prior HEP.  Exercises were reviewed and Hue was able to demonstrate them prior to the end of the session.  Hue demonstrated good  understanding of the HEP provided.   .   See EMR under Patient Instructions for exercises provided prior visit.        Assessment     Hue display fair to poor tolerance of sessio today with continued pain to anterior GH joint and medial deltoid.  She reports pain is still radiating down into finger tips.  She did report reduced pain after message and ultrasound. She displays fair tolerance to joint mobility with stiffness, decreased strength, and decreased scapular strength noted.  Hue is not progressing well towards her goals and there are no updates to goals at this time. Pt prognosis is Good.   \Pt will continue to benefit from skilled OT intervention.    Patient continues to demonstrate limitation  with  ROM, Joint mobility, Stiffness, Decreased gross motor coordination, Decreased functional use, Impaired sensation, Decreased strength, Continued pain, and Continued inflammation     Goals:  The following goals were discussed with the patient and patient is in agreement with them as to be addressed in the treatment plan.      Short Term Goals (STGs); to be met within 4 weeks.  STG #1: Pt will report 5 out of 10 pain level at worst in right upper extremity.  STG #2: Pt will demonstrate increased ROM of 20 degrees flexion and abduction in order increase functional use of UE  STG #3: Pt will increase shoulder MMT by 1 muscle grade in all shoulder planes for improved   STG #4: Pt will demonstrate independence with issued HEP.      Long Term  Goals (LTGs); to be met by discharge.  LTG #1: Pt will report a pain level of 2 out of 10 at worst in right upper extremity.     LTG #2: Pt will demo improved FOTO score to predicted level.  LTG #3: Pt will return to prior level of function for ADL.   STG #4: Pt will demonstrate increased ROM WFL in order increase functional use of UE  LTG #5: Pt will increase shoulder MMT within WFL in order to improve functional use of UE.  LTG #6: Pt will demonstrate increased  strength of 20#s in order to increase functional use of UE.    Plan   Plan of Care Certification: 1/14/2025 to 3/14/2025.      Outpatient Occupational Therapy 1 - 2 times weekly for 6 weeks to include the following interventions: Manual therapy/joint mobilizations, Modalities for pain management, US 3 mhz, Therapeutic exercises/activities., Strengthening, Electrical Modalities, and Joint Protection.    Updates/Grading for next session: N/A      Amanda Momin, OT

## 2025-02-12 ENCOUNTER — CLINICAL SUPPORT (OUTPATIENT)
Dept: REHABILITATION | Facility: HOSPITAL | Age: 55
End: 2025-02-12
Payer: MEDICAID

## 2025-02-12 DIAGNOSIS — M25.511 ACUTE PAIN OF RIGHT SHOULDER: Primary | ICD-10-CM

## 2025-02-12 DIAGNOSIS — R29.898 IMPAIRED STRENGTH OF SHOULDER MUSCLES: ICD-10-CM

## 2025-02-12 PROCEDURE — 97530 THERAPEUTIC ACTIVITIES: CPT | Mod: PN

## 2025-02-12 NOTE — PROGRESS NOTES
"  Occupational Therapy Daily Treatment Note     Date: 2/12/2025  Name: Hue Hernandez  MRN: [id number]    Diagnosis:   Encounter Diagnoses   Name Primary?    Acute pain of right shoulder Yes    Impaired strength of shoulder muscles            Referring Physician: Parveen Muhammad II,*    Physician Orders: OT Evaluate and treat   Medical Diagnosis: Shoulder pain   Surgical Procedure and Date: NA  Evaluation Date: 1/14/2025  Insurance Authorization Period Expiration: 1/2/2025 - 1/2/2026  Plan of Care Certification Period: 1/13/2025 - 3/13/2025   Progress Note Due: 2/13/2025   Date of Return to MD: Dana    Visit # / Visits authorized:   6 / 20  RODRÍGUEZ visit number: 0  Time In:   2:30 am  Time Out:  3:15am  Total Billable Time: 42 minutes    Precautions:  Diabetes, blood thinners, CHF, and LATEX and ADHESIVE ALLERGY      Subjective     Pt reports: "My range is getting better but my pain is the same."  she was compliant with home exercise program given last session.     Pain: 6/10  Location: right shoulder      Objective     Active Range of Motion:  (Measured in degrees)    Right Right    Shld Flexion 120 135   Shld Extension 50 50   Shld Abduction 105 125   Shld Adduction 0 0   Shld Ext Rotation 65 65   Shld Int Rotation 50 60         Passive Range of Motion:  (Measured in degrees)    Right   Shld Flexion 110*   Shld Extension 50   Shld Abduction 120*   Shld Adduction 0   Shld Ext Rotation 40*   Shld Int Rotation 35   *Pain     Manual Muscle Test    Right Right    Shld Flexion 3-/5 4/5   Shld Extension 4/5  4+/5   Shld Abduction 3-/5  4/5   Shld Adduction 4/5  4+/5   Shld Ext Rotation 4-/5  4+5   Shld Int Rotation 3+/5  4/5   Elbow Flexion 5/5  5/5   Elbow Extension 5/5  5/5   Elbow Supination 5/5  5/5   Elbow Pronation 5/5  5/5        Treatment consist of the following:     Hue received the following direct contact / supervised modalities for 16 minutes after being cleared for contradictions:    -    - " Ultrasound  3.3MHz with 1.0 Wcm2 100%dutycycle  x 6 minutes with prep  (R) shoulder region to anterior medial region to  promote improved muscle circulation, elasticity, relaxation, and reduce spasm.     - MHP x 10 min to (R) shoulder x 10 min to reduce pain and inflammation prior to session with concurrent FOTO update and gripping exercises including:    Green digiflex composite   Green digiflex individual   Green weighted clip tripod and lateral    Hue participated in dynamic functional therapeutic activities, therapeutic exercise, and neuromuscular re-education Kinesthetic to improve functional performance for 21 minutes including:  Supine Active assisted range of motion including:    Flexion: 3 x 10   Abduction: 3 x 10   External rotation: 3 x 10  Seated external rotation with yellow band: 2 x 10  Short lever shoulder abduction: 2 x 10 yellow band  Row: 2 x 10 yellow band  Shrug: 2  x10 3#  Biceps curls: 3 x 10 red band  Triceps extension: 3 x 10 red band  Updated measurements     Cold Pack - Applied to right shoulder to reduce post session inflammation and pain following therapy for 5 minutes.  Home Exercises and Education Provided     Education provided:   - Roles and goals of Occupational therapy  - Occupational therapy plan of care  - Progress towards goals     Written Home Exercises Provided: Patient instructed to cont prior HEP.  Exercises were reviewed and Hue was able to demonstrate them prior to the end of the session.  Hue demonstrated good  understanding of the HEP provided.   .   See EMR under Patient Instructions for exercises provided prior visit.        Assessment     Hue display fair to fair tolerance to session today. She is demonstrating improved range of motion and strength as documented above. She does remain with relatively consistent pain per Pt. Hue is not progressing slowly towards her goals and there are no updates to goals at this time. Pt prognosis is Good.   Pt will continue to  benefit from skilled OT intervention.    Patient continues to demonstrate limitation  with  ROM, Joint mobility, Stiffness, Decreased gross motor coordination, Decreased functional use, Impaired sensation, Decreased strength, Continued pain, and Continued inflammation     Goals:  The following goals were discussed with the patient and patient is in agreement with them as to be addressed in the treatment plan.      Short Term Goals (STGs); to be met within 4 weeks.  STG #1: Pt will report 5 out of 10 pain level at worst in right upper extremity.  STG #2: Pt will demonstrate increased ROM of 20 degrees flexion and abduction in order increase functional use of UE  STG #3: Pt will increase shoulder MMT by 1 muscle grade in all shoulder planes for improved   STG #4: Pt will demonstrate independence with issued HEP.      Long Term Goals (LTGs); to be met by discharge.  LTG #1: Pt will report a pain level of 2 out of 10 at worst in right upper extremity.     LTG #2: Pt will demo improved FOTO score to predicted level.  LTG #3: Pt will return to prior level of function for ADL.   STG #4: Pt will demonstrate increased ROM WFL in order increase functional use of UE  LTG #5: Pt will increase shoulder MMT within WFL in order to improve functional use of UE.  LTG #6: Pt will demonstrate increased  strength of 20#s in order to increase functional use of UE.    Plan   Plan of Care Certification: 1/14/2025 to 3/14/2025.      Outpatient Occupational Therapy 1 - 2 times weekly for 6 weeks to include the following interventions: Manual therapy/joint mobilizations, Modalities for pain management, US 3 mhz, Therapeutic exercises/activities., Strengthening, Electrical Modalities, and Joint Protection.    Updates/Grading for next session: N/A      Shahbaz Galicia OT

## 2025-02-13 ENCOUNTER — TELEPHONE (OUTPATIENT)
Dept: SMOKING CESSATION | Facility: CLINIC | Age: 55
End: 2025-02-13
Payer: MEDICAID

## 2025-02-17 ENCOUNTER — CLINICAL SUPPORT (OUTPATIENT)
Dept: REHABILITATION | Facility: HOSPITAL | Age: 55
End: 2025-02-17
Payer: MEDICAID

## 2025-02-17 DIAGNOSIS — R29.898 IMPAIRED STRENGTH OF SHOULDER MUSCLES: ICD-10-CM

## 2025-02-17 DIAGNOSIS — M25.511 ACUTE PAIN OF RIGHT SHOULDER: Primary | ICD-10-CM

## 2025-02-17 DIAGNOSIS — M25.612 IMPAIRED RANGE OF MOTION OF LEFT SHOULDER: ICD-10-CM

## 2025-02-17 PROCEDURE — 97530 THERAPEUTIC ACTIVITIES: CPT | Mod: PN

## 2025-02-17 NOTE — PROGRESS NOTES
"  Occupational Therapy Daily Treatment Note     Date: 2/17/2025  Name: Hue Hernandez  MRN: [id number]    Diagnosis:   Encounter Diagnoses   Name Primary?    Acute pain of right shoulder Yes    Impaired strength of shoulder muscles     Impaired range of motion of left shoulder        Referring Physician: Parveen Muhammad II,*    Physician Orders: OT Evaluate and treat   Medical Diagnosis: Shoulder pain   Surgical Procedure and Date: NA  Evaluation Date: 1/14/2025  Insurance Authorization Period Expiration: 1/2/2025 - 1/2/2026  Plan of Care Certification Period: 1/13/2025 - 3/13/2025   Progress Note Due: 2/13/2025   Date of Return to MD: Dana    Visit # / Visits authorized:   6 / 20  RODRÍGUEZ visit number: 0  Time In:   2:30 am  Time Out:  3:15am  Total Billable Time: 42 minutes    Precautions:  Diabetes, blood thinners, CHF, and LATEX and ADHESIVE ALLERGY      Subjective     Pt reports: "It actually feels a little better today."  she was compliant with home exercise program given last session.     Pain: 5/10  Location: right shoulder      Objective     Active Range of Motion:  (Measured in degrees)    Right Right    Shld Flexion 120 135   Shld Extension 50 50   Shld Abduction 105 125   Shld Adduction 0 0   Shld Ext Rotation 65 65   Shld Int Rotation 50 60         Passive Range of Motion:  (Measured in degrees)    Right   Shld Flexion 110*   Shld Extension 50   Shld Abduction 120*   Shld Adduction 0   Shld Ext Rotation 40*   Shld Int Rotation 35   *Pain     Manual Muscle Test    Right Right    Shld Flexion 3-/5 4/5   Shld Extension 4/5  4+/5   Shld Abduction 3-/5  4/5   Shld Adduction 4/5  4+/5   Shld Ext Rotation 4-/5  4+5   Shld Int Rotation 3+/5  4/5   Elbow Flexion 5/5  5/5   Elbow Extension 5/5  5/5   Elbow Supination 5/5  5/5   Elbow Pronation 5/5  5/5        Treatment consist of the following:     Hue received the following direct contact / supervised modalities for 8 minutes after being cleared for " contradictions:    -    - Ultrasound  3.3MHz with 1.0 Wcm2 100%dutycycle  x 8 minutes with prep  (R) shoulder region to anterior medial region to  promote improved muscle circulation, elasticity, relaxation, and reduce spasm.    Hue participated in dynamic functional therapeutic activities, therapeutic exercise, and neuromuscular re-education Kinesthetic to improve functional performance for 32 minutes including:  Wall climbs: 1 x 10   Supine Active assisted range of motion including:    Flexion: 3 x 10   Abduction: 3 x 10   External rotation: 3 x 10  Seated external rotation with yellow band: 2 x 10  Short lever flexion: 2 x 10   Scaption: 2 x 10 2#  Horizontal abduction: 2 x 10 yellow band   Biceps curls: 3 x 10 2#  Triceps extension: 3 x 10 red band  Shrug: 3 x 10 2#  Row: 3 x 10 red band    Cold Pack - Applied to right shoulder to reduce post session inflammation and pain following therapy for 5 minutes.  Home Exercises and Education Provided     Education provided:   - Roles and goals of Occupational therapy  - Occupational therapy plan of care  - Progress towards goals     Written Home Exercises Provided: Patient instructed to cont prior HEP.  Exercises were reviewed and Hue was able to demonstrate them prior to the end of the session.  Hue demonstrated good  understanding of the HEP provided.   .   See EMR under Patient Instructions for exercises provided prior visit.        Assessment     Hue display fair to fair tolerance to session today. She reports to therapy today with reduced pain levels from last session. She tolerated session fairly today with mild pain noted with supine flexion beyond 90 degrees. No discomfort with resisted scaption today. Hue is not progressing slowly towards her goals and there are no updates to goals at this time. Pt prognosis is Good.   Pt will continue to benefit from skilled OT intervention.    Patient continues to demonstrate limitation  with  ROM, Joint mobility,  Stiffness, Decreased gross motor coordination, Decreased functional use, Impaired sensation, Decreased strength, Continued pain, and Continued inflammation     Goals:  The following goals were discussed with the patient and patient is in agreement with them as to be addressed in the treatment plan.      Short Term Goals (STGs); to be met within 4 weeks.  STG #1: Pt will report 5 out of 10 pain level at worst in right upper extremity.  STG #2: Pt will demonstrate increased ROM of 20 degrees flexion and abduction in order increase functional use of UE  STG #3: Pt will increase shoulder MMT by 1 muscle grade in all shoulder planes for improved   STG #4: Pt will demonstrate independence with issued HEP.      Long Term Goals (LTGs); to be met by discharge.  LTG #1: Pt will report a pain level of 2 out of 10 at worst in right upper extremity.     LTG #2: Pt will demo improved FOTO score to predicted level.  LTG #3: Pt will return to prior level of function for ADL.   STG #4: Pt will demonstrate increased ROM WFL in order increase functional use of UE  LTG #5: Pt will increase shoulder MMT within WFL in order to improve functional use of UE.  LTG #6: Pt will demonstrate increased  strength of 20#s in order to increase functional use of UE.    Plan   Plan of Care Certification: 1/14/2025 to 3/14/2025.      Outpatient Occupational Therapy 1 - 2 times weekly for 6 weeks to include the following interventions: Manual therapy/joint mobilizations, Modalities for pain management, US 3 mhz, Therapeutic exercises/activities., Strengthening, Electrical Modalities, and Joint Protection.    Updates/Grading for next session: N/A      Shahbaz Galicia OT

## 2025-02-26 ENCOUNTER — CLINICAL SUPPORT (OUTPATIENT)
Dept: REHABILITATION | Facility: HOSPITAL | Age: 55
End: 2025-02-26
Payer: MEDICAID

## 2025-02-26 ENCOUNTER — CLINICAL SUPPORT (OUTPATIENT)
Dept: SMOKING CESSATION | Facility: CLINIC | Age: 55
End: 2025-02-26
Payer: COMMERCIAL

## 2025-02-26 DIAGNOSIS — R29.898 IMPAIRED STRENGTH OF SHOULDER MUSCLES: ICD-10-CM

## 2025-02-26 DIAGNOSIS — F17.200 NICOTINE DEPENDENCE: Primary | ICD-10-CM

## 2025-02-26 DIAGNOSIS — M25.511 ACUTE PAIN OF RIGHT SHOULDER: Primary | ICD-10-CM

## 2025-02-26 PROCEDURE — 99999 PR PBB SHADOW E&M-EST. PATIENT-LVL I: CPT | Mod: PBBFAC,,,

## 2025-02-26 PROCEDURE — 97530 THERAPEUTIC ACTIVITIES: CPT | Mod: PN,CO

## 2025-02-26 PROCEDURE — 99404 PREV MED CNSL INDIV APPRX 60: CPT | Mod: S$GLB,,,

## 2025-02-26 NOTE — PROGRESS NOTES
Individual Follow-Up Form    2/26/2025    Quit Date: 1/1/25    Clinical Status of Patient: Outpatient    Length of Service: 60 minutes    Continuing Medication: yes  Chantix patient instructed to D/C    Other Medications: none     Target Symptoms: Withdrawal and medication side effects. The following were  rated moderate (3) to severe (4) on TCRS:  Moderate (3): desire/crave tobacco  Severe (4): none    Comments: Patient seen in clinic regarding smoking cessation follow up. Ms. Swann remains tobacco free without complications. She has been instructed to discontinue cessation medication, chantix. completion of TCRS (Tobacco Cessation Rating Scale) reviewed strategies, cues, triggers, high risk situations, lapses, relapses, diet, exercise, stress, relaxation, sleep, habitual behavior, and life style changes. Pt's exhaled carbon monoxide level was 1 ppm as per [employer]. (non- smoker = 0-5 ppm.)Commended pt for ongoing success. Notified pt of future follow ups to expect from case managment and additional benefits available through the smoking cessation trust if needed. Ms. Swann has successfully completed smoking cessation program. Encouraged to contact counseling at [facility] prn.    Diagnosis: F17.200    Next Visit: 3 months, case management

## 2025-02-26 NOTE — PROGRESS NOTES
"  Occupational Therapy Daily Treatment Note     Date: 2/26/2025  Name: Hue Hernandez  MRN: [id number]    Diagnosis:   Encounter Diagnoses   Name Primary?    Acute pain of right shoulder Yes    Impaired strength of shoulder muscles        Referring Physician: Parveen Muhammad II,*    Physician Orders: OT Evaluate and treat   Medical Diagnosis: Shoulder pain   Surgical Procedure and Date: NA  Evaluation Date: 1/14/2025  Insurance Authorization Period Expiration: 1/2/2025 - 1/2/2026  Plan of Care Certification Period: 1/13/2025 - 3/13/2025   Progress Note Due: 2/13/2025   Date of Return to MD: Dana    Visit # / Visits authorized:   8 / 20  RODRÍGUEZ visit number: 1  Time In: 1:45 pm  Time Out:  2:30 pm  Total Billable Time: 45 minutes    Precautions:  Diabetes, blood thinners, CHF, and LATEX and ADHESIVE ALLERGY      Subjective     Pt reports: "it comes in goes"  Pt has slight increase in pain over last several days.   she was compliant with home exercise program given last session.     Pain: 6/10  Location: right shoulder      Objective     Active Range of Motion:  (Measured in degrees)    Right Right    Shld Flexion 120 135   Shld Extension 50 50   Shld Abduction 105 125   Shld Adduction 0 0   Shld Ext Rotation 65 65   Shld Int Rotation 50 60         Passive Range of Motion:  (Measured in degrees)    Right   Shld Flexion 110*   Shld Extension 50   Shld Abduction 120*   Shld Adduction 0   Shld Ext Rotation 40*   Shld Int Rotation 35   *Pain     Manual Muscle Test    Right Right    Shld Flexion 3-/5 4/5   Shld Extension 4/5  4+/5   Shld Abduction 3-/5  4/5   Shld Adduction 4/5  4+/5   Shld Ext Rotation 4-/5  4+5   Shld Int Rotation 3+/5  4/5   Elbow Flexion 5/5  5/5   Elbow Extension 5/5  5/5   Elbow Supination 5/5  5/5   Elbow Pronation 5/5  5/5        Treatment consist of the following:     Hue received the following direct contact / supervised modalities for 5 minutes after being cleared for contradictions:    " -Applied MHP to (R) shoulder with double layer coverage to protect skin integrity to increase muscle elasticity and improve blood flow while completing the following therapeutic interventions:   -Black digi-flex individual 2 x 10   -Black digi-flex composite 3 x 10   -Black clothespin pincer pinch 3 x 10   -Black clothespin tripod pinch 3 x 10   -Black clothespin lateral pinch 3 x 10     Hue participated in dynamic functional therapeutic activities, therapeutic exercise, and neuromuscular re-education Kinesthetic to improve functional performance for 40 minutes including:  Wall climbs: 1 x 10 (deferred)  Supine Active assisted range of motion including:    Flexion: 3 x 10   Abduction: 3 x 10   External rotation: 3 x 10  Seated external rotation with yellow band: 2 x 10  Short lever flexion: 2 x 10 (sharp pain, deferred)  Scaption: 2 x 10 2# (sharp pain, deferred)  Horizontal abduction: 2lb dowel 2 x 10   Biceps curls: 3 x 10 2lb dowel  Triceps extension: 3 x 10 red band tied to 2lb dowel  Shrug: 3 x 10 2#  Row: 3 x 10 red band    Cold Pack - Applied to right shoulder to reduce post session inflammation and pain following therapy for 5 minutes while being educated on home exercise program that has been slightly downgraded due to increase in pain last several days.  Home Exercises and Education Provided     Education provided:   - Roles and goals of Occupational therapy  - Occupational therapy plan of care  - Progress towards goals     Written Home Exercises Provided: Patient instructed to cont prior HEP.  Exercises were reviewed and Hue was able to demonstrate them prior to the end of the session.  Hue demonstrated good  understanding of the HEP provided.   .   See EMR under Patient Instructions for exercises provided prior visit.        Assessment     Hue display fair to fair tolerance to session today. She reports to therapy today with increase pain last few days per patient. She reports nothing different at  home that could be causing the pain. She tolerated session fairly today with sharp pain noted with shoulder flexion and scaption ac/exercise this day; when patient was experiencing this sharp pain, task was terminated.  Hue is not progressing slowly towards her goals and there are no updates to goals at this time. Pt prognosis is Good.   Pt will continue to benefit from skilled OT intervention.    Patient continues to demonstrate limitation  with  ROM, Joint mobility, Stiffness, Decreased gross motor coordination, Decreased functional use, Impaired sensation, Decreased strength, Continued pain, and Continued inflammation     Goals:  The following goals were discussed with the patient and patient is in agreement with them as to be addressed in the treatment plan.      Short Term Goals (STGs); to be met within 4 weeks.  STG #1: Pt will report 5 out of 10 pain level at worst in right upper extremity.  STG #2: Pt will demonstrate increased ROM of 20 degrees flexion and abduction in order increase functional use of UE  STG #3: Pt will increase shoulder MMT by 1 muscle grade in all shoulder planes for improved   STG #4: Pt will demonstrate independence with issued HEP.      Long Term Goals (LTGs); to be met by discharge.  LTG #1: Pt will report a pain level of 2 out of 10 at worst in right upper extremity.     LTG #2: Pt will demo improved FOTO score to predicted level.  LTG #3: Pt will return to prior level of function for ADL.   STG #4: Pt will demonstrate increased ROM WFL in order increase functional use of UE  LTG #5: Pt will increase shoulder MMT within WFL in order to improve functional use of UE.  LTG #6: Pt will demonstrate increased  strength of 20#s in order to increase functional use of UE.    Plan   Plan of Care Certification: 1/14/2025 to 3/14/2025.      Outpatient Occupational Therapy 1 - 2 times weekly for 6 weeks to include the following interventions: Manual therapy/joint mobilizations, Modalities  for pain management, US 3 mhz, Therapeutic exercises/activities., Strengthening, Electrical Modalities, and Joint Protection.    Updates/Grading for next session: N/A      MARCOS Aguilar/TEOFILO

## 2025-03-06 ENCOUNTER — CLINICAL SUPPORT (OUTPATIENT)
Dept: REHABILITATION | Facility: HOSPITAL | Age: 55
End: 2025-03-06
Payer: MEDICAID

## 2025-03-06 DIAGNOSIS — M25.511 ACUTE PAIN OF RIGHT SHOULDER: Primary | ICD-10-CM

## 2025-03-06 DIAGNOSIS — M25.612 IMPAIRED RANGE OF MOTION OF LEFT SHOULDER: ICD-10-CM

## 2025-03-06 DIAGNOSIS — R29.898 IMPAIRED STRENGTH OF SHOULDER MUSCLES: ICD-10-CM

## 2025-03-06 PROCEDURE — 97530 THERAPEUTIC ACTIVITIES: CPT | Mod: PN

## 2025-03-06 NOTE — PROGRESS NOTES
"  Occupational Therapy Daily Treatment Note     Date: 3/6/2025  Name: Hue Hernandez  MRN: [id number]    Diagnosis:   Encounter Diagnoses   Name Primary?    Acute pain of right shoulder Yes    Impaired strength of shoulder muscles     Impaired range of motion of left shoulder            Referring Physician: Parveen Muhammad II,*    Physician Orders: OT Evaluate and treat   Medical Diagnosis: Shoulder pain   Surgical Procedure and Date: NA  Evaluation Date: 1/14/2025  Insurance Authorization Period Expiration: 1/2/2025 - 1/2/2026  Plan of Care Certification Period: 1/13/2025 - 3/13/2025   Progress Note Due: 2/13/2025   Date of Return to MD: Dana    Visit # / Visits authorized:   8 / 20  RODRÍGUEZ visit number: 0  Time In:   1:45 pm  Time Out:  2:30 pm  Total Billable Time: 45 minutes    Precautions:  Diabetes, blood thinners, CHF, and LATEX and ADHESIVE ALLERGY      Subjective     Pt reports: "Something is wrong its getting worse and the pain shooting down my arm" per pt  she was compliant with home exercise program given last session.     Pain: 8/10  Location: right shoulder      Objective       Hue received the following direct contact / supervised modalities for 24 minutes after being cleared for contradictions:    - Ultrasound  1 MHz with 1.3 Wcm2 100%dutycycle  x 5 minutes with prep  (R) shoulder posterior capsule to supraspinatus region to  promote improved muscle circulation, elasticity, relaxation, and reduce spasm.   - Ultrasound  3.3MHz with 1.3 Wcm2 100%dutycycle  x 5 minutes with prep  (R) shoulder region to anterior medial region to  promote improved muscle circulation, elasticity, relaxation, and reduce spasm.   - MHP x 8 min to (R) shoulder x 8 min to reduce pain and inflammation prior to session     Hue participated in dynamic functional therapeutic activities, therapeutic exercise, and neuromuscular re-education Kinesthetic to improve functional performance for 15 minutes including:  Supine " Active assisted range of motion including: (deferred due to increased pain)   Flexion: 3 x 10   Abduction: 3 x 10   External rotation: 3 x 10  Supine short lever abduction: 2 x 10 yellow band (deferred)  Table slides: 20 reps  Shrugs: 30 reps  Retractions: 30 reps   Bicep/tricep red theraband   UBE x 3 min (deferred due to pian)  Manual muscle spasm message anterior medial GH and deltoid (deferred due to pain)      Home Exercises and Education Provided     Education provided:   - Roles and goals of Occupational therapy  - Occupational therapy plan of care  - Progress towards goals     Written Home Exercises Provided: Patient instructed to cont prior HEP.  Exercises were reviewed and Hue was able to demonstrate them prior to the end of the session.  Hue demonstrated good  understanding of the HEP provided.   .   See EMR under Patient Instructions for exercises provided prior visit.        Assessment     Hue display fair to poor tolerance of session today with continued pain to anterior GH joint and medial deltoid.  She reports pain is still radiating down into finger tips.  She did report reduced pain after message and ultrasound. She displays fair tolerance to joint mobility with stiffness, decreased strength, and decreased scapular strength noted.  Hue is not progressing well towards her goals and there are no updates to goals at this time. Pt prognosis is Good.   \Pt will continue to benefit from skilled OT intervention.    Patient continues to demonstrate limitation  with  ROM, Joint mobility, Stiffness, Decreased gross motor coordination, Decreased functional use, Impaired sensation, Decreased strength, Continued pain, and Continued inflammation     Goals:  The following goals were discussed with the patient and patient is in agreement with them as to be addressed in the treatment plan.      Short Term Goals (STGs); to be met within 4 weeks.  STG #1: Pt will report 5 out of 10 pain level at worst in right  upper extremity.  STG #2: Pt will demonstrate increased ROM of 20 degrees flexion and abduction in order increase functional use of UE  STG #3: Pt will increase shoulder MMT by 1 muscle grade in all shoulder planes for improved   STG #4: Pt will demonstrate independence with issued HEP.      Long Term Goals (LTGs); to be met by discharge.  LTG #1: Pt will report a pain level of 2 out of 10 at worst in right upper extremity.     LTG #2: Pt will demo improved FOTO score to predicted level.  LTG #3: Pt will return to prior level of function for ADL.   STG #4: Pt will demonstrate increased ROM WFL in order increase functional use of UE  LTG #5: Pt will increase shoulder MMT within WFL in order to improve functional use of UE.  LTG #6: Pt will demonstrate increased  strength of 20#s in order to increase functional use of UE.    Plan   Plan of Care Certification: 1/14/2025 to 3/14/2025.      Outpatient Occupational Therapy 1 - 2 times weekly for 6 weeks to include the following interventions: Manual therapy/joint mobilizations, Modalities for pain management, US 3 mhz, Therapeutic exercises/activities., Strengthening, Electrical Modalities, and Joint Protection.    ** OT is recommending further diagnostic imaging at this time.       Amanda Momin, OT

## 2025-05-19 ENCOUNTER — PATIENT MESSAGE (OUTPATIENT)
Dept: ADMINISTRATIVE | Facility: OTHER | Age: 55
End: 2025-05-19
Payer: MEDICAID

## 2025-05-20 ENCOUNTER — CLINICAL SUPPORT (OUTPATIENT)
Dept: SMOKING CESSATION | Facility: CLINIC | Age: 55
End: 2025-05-20
Payer: COMMERCIAL

## 2025-05-20 DIAGNOSIS — F17.200 NICOTINE DEPENDENCE: Primary | ICD-10-CM

## 2025-05-20 PROCEDURE — 99407 BEHAV CHNG SMOKING > 10 MIN: CPT | Mod: S$GLB,,,

## 2025-05-20 PROCEDURE — 99999 PR PBB SHADOW E&M-EST. PATIENT-LVL I: CPT | Mod: PBBFAC,,,

## 2025-05-20 NOTE — PROGRESS NOTES
Spoke with patient today in regard to smoking cessation progress for 6-month telephone follow up.  Patient states that she is tobacco and nicotine free.  Commended patient on their quit.  Patient states she is doing well and has occasional urges.  Patient was pleased with the counseling she received from [facility], Nancy Burns.  Informed patient of benefit period, future follow up, and contact information if any further help or support is needed.  Will complete smart form for 3/6 month follow up on Quit attempt #3.

## 2025-05-23 PROBLEM — E66.09 CLASS 1 OBESITY DUE TO EXCESS CALORIES WITH SERIOUS COMORBIDITY AND BODY MASS INDEX (BMI) OF 34.0 TO 34.9 IN ADULT: Status: RESOLVED | Noted: 2024-04-16 | Resolved: 2025-05-23

## 2025-05-23 PROBLEM — E66.812 CLASS 2 SEVERE OBESITY DUE TO EXCESS CALORIES WITH SERIOUS COMORBIDITY AND BODY MASS INDEX (BMI) OF 37.0 TO 37.9 IN ADULT: Status: ACTIVE | Noted: 2025-05-23

## 2025-05-23 PROBLEM — E66.811 CLASS 1 OBESITY DUE TO EXCESS CALORIES WITH SERIOUS COMORBIDITY AND BODY MASS INDEX (BMI) OF 34.0 TO 34.9 IN ADULT: Status: RESOLVED | Noted: 2024-04-16 | Resolved: 2025-05-23

## 2025-05-23 PROBLEM — Z87.891 HISTORY OF TOBACCO USE: Status: ACTIVE | Noted: 2025-05-23

## 2025-05-23 PROBLEM — E66.01 CLASS 2 SEVERE OBESITY DUE TO EXCESS CALORIES WITH SERIOUS COMORBIDITY AND BODY MASS INDEX (BMI) OF 37.0 TO 37.9 IN ADULT: Status: ACTIVE | Noted: 2025-05-23

## 2025-05-30 PROBLEM — N25.81 HYPERPARATHYROIDISM, SECONDARY RENAL: Status: ACTIVE | Noted: 2025-05-30

## 2025-05-30 PROBLEM — E83.52 HYPERCALCEMIA: Status: ACTIVE | Noted: 2025-05-30

## 2025-05-30 PROBLEM — Z79.899 LONG-TERM USE OF HIGH-RISK MEDICATION: Status: ACTIVE | Noted: 2025-05-30

## 2025-05-30 PROBLEM — Z15.2: Status: ACTIVE | Noted: 2025-05-23

## 2025-05-30 PROBLEM — E88.82: Status: ACTIVE | Noted: 2025-05-23

## 2025-07-11 ENCOUNTER — CLINICAL SUPPORT (OUTPATIENT)
Dept: REHABILITATION | Facility: HOSPITAL | Age: 55
End: 2025-07-11
Attending: INTERNAL MEDICINE
Payer: MEDICAID

## 2025-07-11 DIAGNOSIS — M54.16 LUMBAR BACK PAIN WITH RADICULOPATHY AFFECTING LOWER EXTREMITY: Primary | ICD-10-CM

## 2025-07-11 PROCEDURE — 97110 THERAPEUTIC EXERCISES: CPT | Mod: PN

## 2025-07-11 PROCEDURE — 97161 PT EVAL LOW COMPLEX 20 MIN: CPT | Mod: PN

## 2025-07-11 NOTE — PROGRESS NOTES
Outpatient Rehab    Physical Therapy Evaluation    Patient Name: Hue Hernandez  MRN: [id number]  YOB: 1970  Encounter Date: 7/11/2025    Therapy Diagnosis:   Encounter Diagnosis   Name Primary?    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   FOTO report - see Media section or FOTO account episode details.     Intake Score: 20%    Precautions:       Subjective   History of Present Illness  Hue is a 54 y.o. female who reports to physical therapy with a chief concern of low back pain.     The patient reports a medical diagnosis of chronic low back pain.    Diagnostic tests related to this condition: X-ray.        History of Present Condition/Illness: Patient is 53 y/o WF who presents with chronic low back pain. She is well-known to our clinic and has seen previously for similar issues. She reports intermittent radiating pain at times. She has had injections in the past. It has been a year since her last injection.    Pain     Patient reports a current pain level of 5/10. Pain at best is reported as 3/10. Pain at worst is reported as 8/10.   Location: low  back  Clinical Progression (since onset): Stable  Pain Qualities: Sharp, Throbbing  Pain-Relieving Factors: Medications - prescription, Ice, Heat  Pain-Aggravating Factors: Standing         Treatment History  Treatments  Previously Received Treatments: Yes  Previous Treatments: Physical therapy    Living Arrangements  Living Situation  Living Arrangements: Family members        Employment  Employment Status: On disability          Past Medical History/Physical Systems Review:   Hue Hernandez  has a past medical history of Anticoagulant long-term use, Arthritis, Back pain, Carpal tunnel syndrome, COPD (chronic obstructive pulmonary disease), Cubital tunnel syndrome, Cubital tunnel syndrome on right, Depression, Diabetes mellitus, Diabetes mellitus, type 2, Diabetic peripheral neuropathy, Encounter for blood transfusion, GERD (gastroesophageal reflux disease), Glaucoma suspect, Glaucoma suspect of both eyes, High cholesterol, Hip pain, History of appendectomy, Hyperlipemia, Hypertension, Muscle spasms of both lower extremities, Neck pain, Neuropathy, Obesity, KENTRELL on CPAP, Radiation thyroiditis, Sensory peripheral neuropathy, Stroke, and Tardive dyskinesia.    Hue Hernandez  has a past surgical history that includes Cholecystectomy; Appendectomy; Eye surgery; Carpal tunnel release (Right);  section (, ); Dilation and curettage of uterus (); Tubal ligation (); Carpal tunnel release (Left, 10/18/2018); Ulnar tunnel release (Left, 10/18/2018); Colonoscopy (N/A, 2023); and Carpal tunnel release (Right, 10/31/2023).    Hue has a current medication list which includes the following prescription(s): albuterol, albuterol, aspirin, baqsimi, bd ultra-fine short pen needle, fasenra pen, blood sugar diagnostic, blood-glucose meter, dexcom g7 sensor, bupropion, buspirone, calcitriol, vraylar, vraylar, cranberry fruit, cyanocobalamin, empagliflozin, repatha syringe, ezetimibe,  fluticasone propionate, trelegy ellipta, hydrocodone-acetaminophen, hydroxyzine hcl, insulin aspart u-100, omnipod 5 g6-g7 pods (gen 5), levothyroxine, liothyronine, magnesium (oxide/aa chelate), metformin, metoprolol succinate, mirtazapine, mounjaro, polyethylene glycol, pregabalin, and senna.    Review of patient's allergies indicates:   Allergen Reactions    Latex, natural rubber Other (See Comments)     Latex condoms burning pain.      Tiotropium bromide Other (See Comments)     Oral irritation/thrush    Iodine      Blisters (mouth)^  Blisters (mouth)^    Adhesive tape-silicones Rash     Use paper tape  If necessary        Objective   Posture    Flat lumbar spine is observed.                 Lower Extremity Sensation  General Lumbar/Lower Extremity Sensation  Intact: Right and Left  Right Lumbar/Lower Extremity Sensation  Intact: Light Touch, Sharp/Dull Discrimination, Static Two Point Discrimination, Dynamic Two Point Discrimination, Kinesthesia, and Proprioception  Right Lumbar/Lower Extremity Sensation Stocking Glove Pattern: No    Left Lumbar/Lower Extremity Sensation  Intact: Light Touch, Static Two Point Discrimination, Dynamic Two Point Discrimination, Sharp/Dull Discrimination, Kinesthesia, and Proprioception  Left Lumbar/Lower Extremity Sensation Stocking Glove Pattern: No             Spinal Mobility  Hypomobile: Lumbosacral           Lumbar Range of Motion   Active (deg) Passive (deg) Pain   Flexion 30       Extension 20       Right Lateral Flexion         Right Rotation 15       Left Lateral Flexion         Left Rotation 15                     Lumbar/Pelvic Girdle Special Tests  Lumbar Tests - Repeated  Positive: Flexion  Negative: Extension       Lumbar Tests - SLR and Tension  Negative: Right Passive Straight Leg Raise and Left Passive Straight Leg Raise                 Pelvic Girdle / Sacrum Tests  Negative: Right CORAL, Left CORAL, Right FADIR, and Left FADIR         Hip Special  Tests  Intra-Articular/Impingement Tests  Negative: Right CORAL, Left CORAL, Right FADIR, and Left FADIR              Treatment:  Modalities  Moist Heat (min): 5 min prior to treatment  Dry Needlin-60 mm to B L4-5 PVM    Time Entry(in minutes):  PT Evaluation (Low) Time Entry: 20  Therapeutic Exercise Time Entry: 15    Assessment & Plan   Assessment  Hue presents with a condition of Low complexity.   Presentation of Symptoms: Stable       Functional Limitations: Range of motion  Impairments: Pain with functional activity, Impaired physical strength  Personal Factors Affecting Prognosis: Motivation, Pain    Patient Goal for Therapy (PT): Patient to have decreased back pain.  Prognosis: Good    Plan  From a physical therapy perspective, the patient would benefit from: Skilled Rehab Services    Planned therapy interventions include: Therapeutic exercise, Therapeutic activities, Neuromuscular re-education, and Manual therapy.    Planned modalities to include: Electrical stimulation - attended, Cryotherapy (cold pack), and Thermotherapy (hot pack).        Visit Frequency: 1 times Per Week for 4 Weeks.       This plan was discussed with Patient.   Discussion participants: Agreed Upon Plan of Care             The patient's spiritual, cultural, and educational needs were considered, and the patient is agreeable to the plan of care and goals.     Education  Education was done with Patient. The patient's learning style includes Demonstration. The patient Demonstrates understanding.                 Goals:   Active       Ambulation/movement       Patient will ambulate with normal gait pattern with no device without antalgic limp for 250 ft       Start:  25    Expected End:  08/15/25               Functional outcome       Patient stated goal: Patient to have decreased back pain.        Start:  25    Expected End:  08/15/25            Patient will demonstrate independence in home program for support of  progression       Start:  07/11/25    Expected End:  08/15/25               Maintaining body position       Patient will maintain upright position for performing household chores without pain.       Start:  07/11/25    Expected End:  08/15/25               Pain       Patient will report pain of 2/10 demonstrating a reduction of overall pain       Start:  07/11/25    Expected End:  08/15/25               Range of Motion       Patient will achieve spinal flexion to WNL painfree.       Start:  07/11/25    Expected End:  08/15/25                Kira Summers PT

## 2025-07-15 ENCOUNTER — CLINICAL SUPPORT (OUTPATIENT)
Dept: REHABILITATION | Facility: HOSPITAL | Age: 55
End: 2025-07-15
Payer: MEDICAID

## 2025-07-15 DIAGNOSIS — M54.16 LUMBAR BACK PAIN WITH RADICULOPATHY AFFECTING LOWER EXTREMITY: Primary | ICD-10-CM

## 2025-07-15 PROCEDURE — 97110 THERAPEUTIC EXERCISES: CPT | Mod: PN

## 2025-07-15 NOTE — PROGRESS NOTES
Outpatient Rehab    Physical Therapy Visit    Patient Name: Hue Hernandez  MRN: [id number]  YOB: 1970  Encounter Date: 7/15/2025    Therapy Diagnosis:   Encounter Diagnosis   Name Primary?    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 and body mass index (BMI) of 37.0 to 37.9 in adult  Surgical Diagnosis: Not applicable for this Episode   Surgical Date: Not applicable for this Episode  Days Since Last Surgery: Not applicable for this Episode    Visit # / Visits Authorized:  1 / 10  Insurance Authorization Period: 7/10/2025 to 2025  Date of Evaluation: 2025  Plan of Care Certification: 2025 to 8/15/2025      PT/PTA:     Number of PTA visits since last PT visit:   Time In: 1430   Time Out: 1500  Total Time (in minutes): 30   Total Billable Time (in minutes): 30    FOTO:  Intake Score: 20%  Survey Score 2: Not applicable for this Episode%  Survey Score 3: Not applicable for this Episode%    Precautions:         Subjective   Patient reports the FDN helped for a couple days. She has been trying her stretched at home..  Pain reported as 5/10.      Objective            Treatment:  Modalities  Moist Heat (min): 5 min prior to treatment  Dry Needlin-60 mm to B L4-5 PVM    Time Entry(in minutes):  Therapeutic Exercise Time Entry: 30    Assessment & Plan   Assessment: Patient tolerated FDN well with no increased pain.        The patient will continue to benefit from skilled outpatient physical therapy in order to address the deficits listed in the problem list on the initial evaluation, provide patient and family education, and maximize the patients level of independence in the home and community environments.     The patient's spiritual,  cultural, and educational needs were considered, and the patient is agreeable to the plan of care and goals.           Plan: Cont with POC.    Goals:   Active       Ambulation/movement       Patient will ambulate with normal gait pattern with no device without antalgic limp for 250 ft       Start:  07/11/25    Expected End:  08/15/25               Functional outcome       Patient stated goal: Patient to have decreased back pain.        Start:  07/11/25    Expected End:  08/15/25            Patient will demonstrate independence in home program for support of progression       Start:  07/11/25    Expected End:  08/15/25               Maintaining body position       Patient will maintain upright position for performing household chores without pain.       Start:  07/11/25    Expected End:  08/15/25               Pain       Patient will report pain of 2/10 demonstrating a reduction of overall pain       Start:  07/11/25    Expected End:  08/15/25               Range of Motion       Patient will achieve spinal flexion to WNL painfree.       Start:  07/11/25    Expected End:  08/15/25                Kira Summers PT

## 2025-07-22 ENCOUNTER — CLINICAL SUPPORT (OUTPATIENT)
Dept: REHABILITATION | Facility: HOSPITAL | Age: 55
End: 2025-07-22
Payer: MEDICAID

## 2025-07-22 DIAGNOSIS — M54.16 LUMBAR BACK PAIN WITH RADICULOPATHY AFFECTING LOWER EXTREMITY: Primary | ICD-10-CM

## 2025-07-22 PROCEDURE — 97110 THERAPEUTIC EXERCISES: CPT | Mod: PN,CQ

## 2025-07-22 NOTE — PROGRESS NOTES
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 and body mass index (BMI) of 37.0 to 37.9 in adult  Surgical Diagnosis: Not applicable for this Episode   Surgical Date: Not applicable for this Episode  Days Since Last Surgery: Not applicable for this Episode    Visit # / Visits Authorized:  2 / 10  Insurance Authorization Period: 7/10/2025 to 12/31/2025  Date of Evaluation: 7/11/2025  Plan of Care Certification: 7/11/2025 to 8/15/2025      PT/PTA: PTA   Number of PTA visits since last PT visit:1  Time In: 1505   Time Out: 1531  Total Time (in minutes): 26   Total Billable Time (in minutes): 26    FOTO:  Intake Score: 20%  Survey Score 2: Not applicable for this Episode%  Survey Score 3: Not applicable for this Episode%    Precautions:         Subjective   Pt reports the FDN really helps. She states it allows her to walk better for the remainder of the day..  Pain reported as 5/10. LB    Objective            Treatment:  Therapeutic Exercise  TE 1: 2 x 10 SB roll  TE 2: 2 x 10 SB LTR  TE 3: 2 x 10 SB mini bridge/pelvic tilt  TE 4: 2 x 10 RTB supine hip flexion  TE 5: 2 x 10 SL abd fall out  TE 6: 10x's 3-D seated ball roll outs      Time Entry(in minutes):  Hot/Cold Pack Time Entry: 10  Therapeutic Exercise Time Entry: 26    Assessment & Plan   Assessment: Initiated therapy ex's. Pt performed with good understanding. She requested not to perform standing ex's today due to discomfort.  Evaluation/Treatment Tolerance: Patient limited by pain    The patient will continue to benefit from skilled outpatient physical therapy in order to address the deficits listed in the problem list on the initial  evaluation, provide patient and family education, and maximize the patients level of independence in the home and community environments.     The patient's spiritual, cultural, and educational needs were considered, and the patient is agreeable to the plan of care and goals.           Plan: Cont with POC.    Goals:   Active       Ambulation/movement       Patient will ambulate with normal gait pattern with no device without antalgic limp for 250 ft       Start:  07/11/25    Expected End:  08/15/25               Functional outcome       Patient stated goal: Patient to have decreased back pain.        Start:  07/11/25    Expected End:  08/15/25            Patient will demonstrate independence in home program for support of progression       Start:  07/11/25    Expected End:  08/15/25               Maintaining body position       Patient will maintain upright position for performing household chores without pain.       Start:  07/11/25    Expected End:  08/15/25               Pain       Patient will report pain of 2/10 demonstrating a reduction of overall pain       Start:  07/11/25    Expected End:  08/15/25               Range of Motion       Patient will achieve spinal flexion to WNL painfree.       Start:  07/11/25    Expected End:  08/15/25                Kelly Salazar PTA

## 2025-07-31 ENCOUNTER — CLINICAL SUPPORT (OUTPATIENT)
Dept: REHABILITATION | Facility: HOSPITAL | Age: 55
End: 2025-07-31
Payer: MEDICAID

## 2025-07-31 DIAGNOSIS — M54.16 LUMBAR BACK PAIN WITH RADICULOPATHY AFFECTING LOWER EXTREMITY: Primary | ICD-10-CM

## 2025-07-31 PROCEDURE — 97110 THERAPEUTIC EXERCISES: CPT | Mod: PN

## 2025-07-31 NOTE — PROGRESS NOTES
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 and body mass index (BMI) of 37.0 to 37.9 in adult  Surgical Diagnosis: Not applicable for this Episode   Surgical Date: Not applicable for this Episode  Days Since Last Surgery: Not applicable for this Episode    Visit # / Visits Authorized:  3 / 10  Insurance Authorization Period: 7/10/2025 to 2025  Date of Evaluation: 2025  Plan of Care Certification: 2025 to 8/15/2025      PT/PTA:     Number of PTA visits since last PT visit:   Time In: 1345   Time Out: 1425  Total Time (in minutes): 40   Total Billable Time (in minutes): 40    FOTO:  Intake Score: 20%  Survey Score 2: Not applicable for this Episode%  Survey Score 3: Not applicable for this Episode%    Precautions:         Subjective   Patient reports that she was very stiff from sitting for long periods yesterday..  Pain reported as 4/10.      Objective            Treatment:  Therapeutic Exercise  TE 1: 2 x 10 SB roll  TE 2: 2 x 10 SB LTR  TE 5: 2 x 10 SL abd fall out  Modalities  Moist Heat (min): 5 min prior to treatment  Dry Needlin-60 mm to B L4-5 PVM with e-stim      Time Entry(in minutes):  Therapeutic Exercise Time Entry: 40    Assessment & Plan   Assessment: Patient tolerated treatment well. Positive response to FDN.       The patient will continue to benefit from skilled outpatient physical therapy in order to address the deficits listed in the problem list on the initial evaluation, provide patient and family education, and maximize the patients level of independence in the home and community environments.     The patient's spiritual, cultural, and educational  needs were considered, and the patient is agreeable to the plan of care and goals.           Plan: Cont with POC.    Goals:   Active       Ambulation/movement       Patient will ambulate with normal gait pattern with no device without antalgic limp for 250 ft       Start:  07/11/25    Expected End:  08/15/25               Functional outcome       Patient stated goal: Patient to have decreased back pain.        Start:  07/11/25    Expected End:  08/15/25            Patient will demonstrate independence in home program for support of progression       Start:  07/11/25    Expected End:  08/15/25               Maintaining body position       Patient will maintain upright position for performing household chores without pain.       Start:  07/11/25    Expected End:  08/15/25               Pain       Patient will report pain of 2/10 demonstrating a reduction of overall pain       Start:  07/11/25    Expected End:  08/15/25               Range of Motion       Patient will achieve spinal flexion to WNL painfree.       Start:  07/11/25    Expected End:  08/15/25                Kira Summers, PT

## 2025-08-07 ENCOUNTER — CLINICAL SUPPORT (OUTPATIENT)
Dept: REHABILITATION | Facility: HOSPITAL | Age: 55
End: 2025-08-07
Payer: MEDICAID

## 2025-08-07 DIAGNOSIS — M54.16 LUMBAR BACK PAIN WITH RADICULOPATHY AFFECTING LOWER EXTREMITY: Primary | ICD-10-CM

## 2025-08-07 PROCEDURE — 97110 THERAPEUTIC EXERCISES: CPT | Mod: PN

## 2025-08-07 NOTE — PROGRESS NOTES
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 and body mass index (BMI) of 37.0 to 37.9 in adult  Surgical Diagnosis: Not applicable for this Episode   Surgical Date: Not applicable for this Episode  Days Since Last Surgery: Not applicable for this Episode    Visit # / Visits Authorized:  4 / 10  Insurance Authorization Period: 7/10/2025 to 12/31/2025  Date of Evaluation: 7/11/2025  Plan of Care Certification: 7/11/2025 to 8/15/2025      PT/PTA:     Number of PTA visits since last PT visit:   Time In: 1345   Time Out: 1425  Total Time (in minutes): 40   Total Billable Time (in minutes): 40    FOTO:  Intake Score: 20%  Survey Score 2: 31%  Survey Score 3: 31%    Precautions:         Subjective   Patient with no new c/o..  Pain reported as 3/10.      Objective            Treatment:  Therapeutic Exercise  TE 1: 2 x 10 SB roll  TE 2: 2 x 10 SB LTR  TE 4: 2 x 10 RTB supine hip flexion  TE 5: 2 x 10 SL abd fall out        Time Entry(in minutes):  Therapeutic Exercise Time Entry: 40    Assessment & Plan   Assessment: Patient tolerated treatment well. Positive response to FDN.       The patient will continue to benefit from skilled outpatient physical therapy in order to address the deficits listed in the problem list on the initial evaluation, provide patient and family education, and maximize the patients level of independence in the home and community environments.     The patient's spiritual, cultural, and educational needs were considered, and the patient is agreeable to the plan of care and goals.           Plan: Cont with POC.    Goals:   Active       Ambulation/movement       Patient will  ambulate with normal gait pattern with no device without antalgic limp for 250 ft       Start:  07/11/25    Expected End:  08/15/25               Functional outcome       Patient stated goal: Patient to have decreased back pain.        Start:  07/11/25    Expected End:  08/15/25            Patient will demonstrate independence in home program for support of progression       Start:  07/11/25    Expected End:  08/15/25               Maintaining body position       Patient will maintain upright position for performing household chores without pain.       Start:  07/11/25    Expected End:  08/15/25               Pain       Patient will report pain of 2/10 demonstrating a reduction of overall pain       Start:  07/11/25    Expected End:  08/15/25               Range of Motion       Patient will achieve spinal flexion to WNL painfree.       Start:  07/11/25    Expected End:  08/15/25                Kira Summers PT